# Patient Record
Sex: MALE | Race: OTHER | NOT HISPANIC OR LATINO | URBAN - METROPOLITAN AREA
[De-identification: names, ages, dates, MRNs, and addresses within clinical notes are randomized per-mention and may not be internally consistent; named-entity substitution may affect disease eponyms.]

---

## 2017-06-18 ENCOUNTER — INPATIENT (INPATIENT)
Facility: HOSPITAL | Age: 62
LOS: 17 days | Discharge: HOME HEALTH | DRG: 459 | End: 2017-07-06
Attending: ORTHOPAEDIC SURGERY | Admitting: ORTHOPAEDIC SURGERY
Payer: MEDICAID

## 2017-06-18 VITALS
SYSTOLIC BLOOD PRESSURE: 150 MMHG | TEMPERATURE: 98 F | HEART RATE: 88 BPM | OXYGEN SATURATION: 100 % | DIASTOLIC BLOOD PRESSURE: 90 MMHG | WEIGHT: 235.01 LBS | RESPIRATION RATE: 16 BRPM

## 2017-06-18 DIAGNOSIS — T84.018A BROKEN INTERNAL JOINT PROSTHESIS, OTHER SITE, INITIAL ENCOUNTER: ICD-10-CM

## 2017-06-18 LAB
ALBUMIN SERPL ELPH-MCNC: 4 G/DL — SIGNIFICANT CHANGE UP (ref 3.3–5)
ALP SERPL-CCNC: 75 U/L — SIGNIFICANT CHANGE UP (ref 40–120)
ALT FLD-CCNC: 18 U/L — SIGNIFICANT CHANGE UP (ref 10–45)
ANION GAP SERPL CALC-SCNC: 11 MMOL/L — SIGNIFICANT CHANGE UP (ref 5–17)
APTT BLD: 36.8 SEC — SIGNIFICANT CHANGE UP (ref 27.5–37.4)
AST SERPL-CCNC: 26 U/L — SIGNIFICANT CHANGE UP (ref 10–40)
BASOPHILS NFR BLD AUTO: 0.2 % — SIGNIFICANT CHANGE UP (ref 0–2)
BILIRUB SERPL-MCNC: 0.7 MG/DL — SIGNIFICANT CHANGE UP (ref 0.2–1.2)
BUN SERPL-MCNC: 13 MG/DL — SIGNIFICANT CHANGE UP (ref 7–23)
CALCIUM SERPL-MCNC: 9.2 MG/DL — SIGNIFICANT CHANGE UP (ref 8.4–10.5)
CHLORIDE SERPL-SCNC: 101 MMOL/L — SIGNIFICANT CHANGE UP (ref 96–108)
CO2 SERPL-SCNC: 25 MMOL/L — SIGNIFICANT CHANGE UP (ref 22–31)
CREAT SERPL-MCNC: 0.9 MG/DL — SIGNIFICANT CHANGE UP (ref 0.5–1.3)
EOSINOPHIL NFR BLD AUTO: 1.8 % — SIGNIFICANT CHANGE UP (ref 0–6)
GLUCOSE SERPL-MCNC: 118 MG/DL — HIGH (ref 70–99)
HCT VFR BLD CALC: 40.4 % — SIGNIFICANT CHANGE UP (ref 39–50)
HGB BLD-MCNC: 14.1 G/DL — SIGNIFICANT CHANGE UP (ref 13–17)
INR BLD: 1.03 — SIGNIFICANT CHANGE UP (ref 0.88–1.16)
LYMPHOCYTES # BLD AUTO: 23.3 % — SIGNIFICANT CHANGE UP (ref 13–44)
MCHC RBC-ENTMCNC: 28.2 PG — SIGNIFICANT CHANGE UP (ref 27–34)
MCHC RBC-ENTMCNC: 34.9 G/DL — SIGNIFICANT CHANGE UP (ref 32–36)
MCV RBC AUTO: 80.8 FL — SIGNIFICANT CHANGE UP (ref 80–100)
MONOCYTES NFR BLD AUTO: 9.6 % — SIGNIFICANT CHANGE UP (ref 2–14)
NEUTROPHILS NFR BLD AUTO: 65.1 % — SIGNIFICANT CHANGE UP (ref 43–77)
PLATELET # BLD AUTO: 249 K/UL — SIGNIFICANT CHANGE UP (ref 150–400)
POTASSIUM SERPL-MCNC: 4.9 MMOL/L — SIGNIFICANT CHANGE UP (ref 3.5–5.3)
POTASSIUM SERPL-SCNC: 4.9 MMOL/L — SIGNIFICANT CHANGE UP (ref 3.5–5.3)
PROT SERPL-MCNC: 7.4 G/DL — SIGNIFICANT CHANGE UP (ref 6–8.3)
PROTHROM AB SERPL-ACNC: 11.4 SEC — SIGNIFICANT CHANGE UP (ref 9.8–12.7)
RBC # BLD: 5 M/UL — SIGNIFICANT CHANGE UP (ref 4.2–5.8)
RBC # FLD: 13.9 % — SIGNIFICANT CHANGE UP (ref 10.3–16.9)
SODIUM SERPL-SCNC: 137 MMOL/L — SIGNIFICANT CHANGE UP (ref 135–145)
WBC # BLD: 5.5 K/UL — SIGNIFICANT CHANGE UP (ref 3.8–10.5)
WBC # FLD AUTO: 5.5 K/UL — SIGNIFICANT CHANGE UP (ref 3.8–10.5)

## 2017-06-18 PROCEDURE — 99285 EMERGENCY DEPT VISIT HI MDM: CPT | Mod: 25

## 2017-06-18 PROCEDURE — 72100 X-RAY EXAM L-S SPINE 2/3 VWS: CPT | Mod: 26

## 2017-06-18 PROCEDURE — 72070 X-RAY EXAM THORAC SPINE 2VWS: CPT | Mod: 26

## 2017-06-18 PROCEDURE — 72128 CT CHEST SPINE W/O DYE: CPT | Mod: 26

## 2017-06-18 PROCEDURE — 93010 ELECTROCARDIOGRAM REPORT: CPT

## 2017-06-18 PROCEDURE — 72131 CT LUMBAR SPINE W/O DYE: CPT | Mod: 26

## 2017-06-18 PROCEDURE — 71010: CPT | Mod: 26

## 2017-06-18 RX ORDER — OXYCODONE HYDROCHLORIDE 5 MG/1
10 TABLET ORAL EVERY 4 HOURS
Qty: 0 | Refills: 0 | Status: DISCONTINUED | OUTPATIENT
Start: 2017-06-18 | End: 2017-06-20

## 2017-06-18 RX ORDER — SODIUM CHLORIDE 9 MG/ML
1000 INJECTION, SOLUTION INTRAVENOUS
Qty: 0 | Refills: 0 | Status: DISCONTINUED | OUTPATIENT
Start: 2017-06-18 | End: 2017-06-20

## 2017-06-18 RX ORDER — ACETAMINOPHEN 500 MG
650 TABLET ORAL EVERY 6 HOURS
Qty: 0 | Refills: 0 | Status: DISCONTINUED | OUTPATIENT
Start: 2017-06-18 | End: 2017-06-20

## 2017-06-18 RX ORDER — MAGNESIUM HYDROXIDE 400 MG/1
30 TABLET, CHEWABLE ORAL DAILY
Qty: 0 | Refills: 0 | Status: DISCONTINUED | OUTPATIENT
Start: 2017-06-18 | End: 2017-06-20

## 2017-06-18 RX ORDER — MORPHINE SULFATE 50 MG/1
4 CAPSULE, EXTENDED RELEASE ORAL
Qty: 0 | Refills: 0 | Status: DISCONTINUED | OUTPATIENT
Start: 2017-06-18 | End: 2017-06-20

## 2017-06-18 RX ORDER — KETOROLAC TROMETHAMINE 30 MG/ML
30 SYRINGE (ML) INJECTION ONCE
Qty: 0 | Refills: 0 | Status: DISCONTINUED | OUTPATIENT
Start: 2017-06-18 | End: 2017-06-18

## 2017-06-18 RX ORDER — OXYCODONE HYDROCHLORIDE 5 MG/1
5 TABLET ORAL EVERY 4 HOURS
Qty: 0 | Refills: 0 | Status: DISCONTINUED | OUTPATIENT
Start: 2017-06-18 | End: 2017-06-20

## 2017-06-18 RX ORDER — DOCUSATE SODIUM 100 MG
100 CAPSULE ORAL THREE TIMES A DAY
Qty: 0 | Refills: 0 | Status: DISCONTINUED | OUTPATIENT
Start: 2017-06-18 | End: 2017-06-20

## 2017-06-18 RX ADMIN — Medication 30 MILLIGRAM(S): at 16:44

## 2017-06-18 RX ADMIN — Medication 30 MILLIGRAM(S): at 16:14

## 2017-06-18 NOTE — ED PROVIDER NOTE - OBJECTIVE STATEMENT
61yo M hx of spinal fusion by dr vee 2 yrs ago for spinal stenosis, here with low back pain today. Just came from [place], was at a wedding and was exerting himself more than usual. Felt like he strained his back during this time, and pain has worsened. Shoots down L leg, feels "spasms". Denies any numbness or parasthesias, Able to walk with cane but hurts his low back. States this pain is very different than what he had with the spinal stenosis. That pain has not recurred, and he has been doing well in the 2 years since the surgery. Taking aleve for pain with moderate relief. When asked where pain is the worst, point at diffuse low back.

## 2017-06-18 NOTE — ED PROVIDER NOTE - MEDICAL DECISION MAKING DETAILS
here with LBP. pain different from when he needed surgery, lower with sciatica like component. no spinal tenderness, and no pain over spine. able to ambulate with cane in ED which is his baseline. Will try pain meds, re-eval, and have pt follow up with his surgeron dr vee as outpatient

## 2017-06-18 NOTE — H&P ADULT - HISTORY OF PRESENT ILLNESS
Patient is a 62 year old gentleman s/p T10 to sacrum posterior decompression, T10 to sacrum posterior segmental instrumentation, T10 to sacrum posterior spinal fusion, pelvic fixation, and Neha osteotomy at L3-L4, L4-L5, and L5-S1 6/6/15.    3 weeks ago patient was lifting something heavy and felt and heard a pop in his back. Since then patient continued to have Back pain, RLE spasms, LLE pain for 3 weeks, getting worse.    Presented to the ED today.

## 2017-06-18 NOTE — H&P ADULT - NSHPPHYSICALEXAM_GEN_ALL_CORE
NAD, non labored respirations  Affected extremity:             Sensation: [x ] intact to light touch  [ ] decreased            Pain Lower back, spasm RLE, Pain LLE                  Vascular: [ x] warm well perfused; capillary refill <3 seconds          Motor exam: [ x]         [x ] Upper extremity                   Earnest (c5)   Bi(c5/6)   WE(c6)   EE(c7)    (c8)                                                R           5              5            5             5             5                                               L            5              5            5             5            5         [x ] Lower extremity                   IP(L2)     Q(L3)     TA(L4)     EHL(L5)     GS(s1)                                                 R          5           5          5            5              5                                               L           5           5         5             5              5

## 2017-06-18 NOTE — H&P ADULT - NSHPLABSRESULTS_GEN_ALL_CORE
14.1   5.5   )-----------( 249      ( 18 Jun 2017 19:28 )             40.4   06-18    137  |  101  |  13  ----------------------------<  118<H>  4.9   |  25  |  0.90    Ca    9.2      18 Jun 2017 19:28    TPro  7.4  /  Alb  4.0  /  TBili  0.7  /  DBili  x   /  AST  26  /  ALT  18  /  AlkPhos  75  06-18

## 2017-06-18 NOTE — H&P ADULT - ASSESSMENT
62 year old gentle man with broken rufus on the R side on his PSF.    1. Admit to Ortho  2. Pain control  3. SCDs  4. Diet  5. CT scan Lumbar and thoracic spine  6. Medical clearance by Dr. Zhang  7. Dr. King is aware, will see in AM.

## 2017-06-18 NOTE — ED ADULT NURSE NOTE - OBJECTIVE STATEMENT
Patient reports hearing a crack about a week ago while ambulating and has been experiencing severe back pain and difficulty ambulating since. Patient reports he has been using a cane. Will continue to monitor patient.

## 2017-06-18 NOTE — H&P ADULT - PMH
Benign prostatic hypertrophy without lower urinary tract symptoms  BPH (benign prostatic hyperplasia)  Essential hypertension  Hypertension  Type 2 diabetes mellitus  Diabetes mellitus type 2 in obese

## 2017-06-18 NOTE — ED ADULT TRIAGE NOTE - CHIEF COMPLAINT QUOTE
low back pain increased over past 2 weeks radiating to left leg and taking aleve that helps - lat at 1200;  and has spasms ; associated HA

## 2017-06-18 NOTE — ED PROVIDER NOTE - MUSCULOSKELETAL, MLM
Spine with large healed incision from thoracic all the way down to sacram, well healed, non tender to palpations. range of motion is not limited, no muscle or joint tenderness. No tenderness to c/t/l spine. No tenderness over pelvis or hip.

## 2017-06-19 DIAGNOSIS — M54.42 LUMBAGO WITH SCIATICA, LEFT SIDE: ICD-10-CM

## 2017-06-19 DIAGNOSIS — Z01.818 ENCOUNTER FOR OTHER PREPROCEDURAL EXAMINATION: ICD-10-CM

## 2017-06-19 DIAGNOSIS — N40.0 BENIGN PROSTATIC HYPERPLASIA WITHOUT LOWER URINARY TRACT SYMPTOMS: ICD-10-CM

## 2017-06-19 DIAGNOSIS — I10 ESSENTIAL (PRIMARY) HYPERTENSION: ICD-10-CM

## 2017-06-19 DIAGNOSIS — E11.9 TYPE 2 DIABETES MELLITUS WITHOUT COMPLICATIONS: ICD-10-CM

## 2017-06-19 LAB
APPEARANCE UR: CLEAR — SIGNIFICANT CHANGE UP
BILIRUB UR-MCNC: NEGATIVE — SIGNIFICANT CHANGE UP
COLOR SPEC: YELLOW — SIGNIFICANT CHANGE UP
DIFF PNL FLD: (no result)
GLUCOSE UR QL: NEGATIVE — SIGNIFICANT CHANGE UP
KETONES UR-MCNC: (no result) MG/DL
LEUKOCYTE ESTERASE UR-ACNC: NEGATIVE — SIGNIFICANT CHANGE UP
MRSA PCR RESULT.: NEGATIVE — SIGNIFICANT CHANGE UP
NITRITE UR-MCNC: NEGATIVE — SIGNIFICANT CHANGE UP
PH UR: 5.5 — SIGNIFICANT CHANGE UP (ref 5–8)
PROT UR-MCNC: NEGATIVE MG/DL — SIGNIFICANT CHANGE UP
S AUREUS DNA NOSE QL NAA+PROBE: NEGATIVE — SIGNIFICANT CHANGE UP
SP GR SPEC: 1.02 — SIGNIFICANT CHANGE UP (ref 1–1.03)
UROBILINOGEN FLD QL: 2 E.U./DL

## 2017-06-19 RX ADMIN — Medication 650 MILLIGRAM(S): at 05:50

## 2017-06-19 RX ADMIN — OXYCODONE HYDROCHLORIDE 10 MILLIGRAM(S): 5 TABLET ORAL at 22:10

## 2017-06-19 RX ADMIN — Medication 650 MILLIGRAM(S): at 06:45

## 2017-06-19 RX ADMIN — Medication 650 MILLIGRAM(S): at 21:10

## 2017-06-19 RX ADMIN — OXYCODONE HYDROCHLORIDE 10 MILLIGRAM(S): 5 TABLET ORAL at 21:10

## 2017-06-19 RX ADMIN — Medication 100 MILLIGRAM(S): at 11:20

## 2017-06-19 RX ADMIN — Medication 650 MILLIGRAM(S): at 22:10

## 2017-06-19 NOTE — PROGRESS NOTE ADULT - SUBJECTIVE AND OBJECTIVE BOX
Ortho Preop Note    Patient is a 62y old  Male who presents with a chief complaint of Back pain, RLE spasms, LLE pain for 3 weeks, getting worse    Diagnosis: Hardware failure Thoracolumbar spine  Procedure: Revision PSF [id number]  Surgeon: Dr. King                          14.1   5.5   )-----------( 249      ( 2017 19:28 )             40.4     06-18    137  |  101  |  13  ----------------------------<  118<H>  4.9   |  25  |  0.90    Ca    9.2      2017 19:28    TPro  7.4  /  Alb  4.0  /  TBili  0.7  /  DBili  x   /  AST  26  /  ALT  18  /  AlkPhos  75  06-18    PT/INR - ( 2017 19:28 )   PT: 11.4 sec;   INR: 1.03          PTT - ( 2017 19:28 )  PTT:36.8 sec  Urinalysis Basic - ( 2017 23:09 )    Color: Yellow / Appearance: Clear / S.025 / pH: x  Gluc: x / Ketone: Trace mg/dL  / Bili: NEGATIVE / Urobili: 2.0 E.U./dL   Blood: x / Protein: NEGATIVE mg/dL / Nitrite: NEGATIVE   Leuk Esterase: NEGATIVE / RBC: < 5 /HPF / WBC < 5 /HPF   Sq Epi: x / Non Sq Epi: Few /HPF / Bacteria: Present /HPF        [x] Type & Screen  [x] CBC  [x] BMP  [x] PT/PTT/INR  [x] Urinalysis  [x] Chest X-ray  [x] EKG  [x] NPO/IVF  [x] Consent  [ ] Clearance  [x] Added on to OR Schedule  [x] Anti-coagulation held  [x] MRSA/MSSA Nasal Screen     Assessment & Plan:  62yMale with Hardware failure thoracolumbar spine  -For OR tomorrow    Ortho Pager [phone]

## 2017-06-19 NOTE — CONSULT NOTE ADULT - SUBJECTIVE AND OBJECTIVE BOX
Patient is a 62y old  Male who presents with a chief complaint of Back pain, RLE spasms, LLE pain for 3 weeks, getting worse. (2017 21:37)      HPI:  Patient is a 62 year old gentleman s/p T10 to sacrum posterior decompression, T10 to sacrum posterior segmental instrumentation, T10 to sacrum posterior spinal fusion, pelvic fixation, and Neha osteotomy at L3-L4, L4-L5, and L5-S1 6/6/15.    3 weeks ago patient was lifting something heavy and felt and heard a pop in his back. Since then patient continued to have Back pain, RLE spasms, LLE pain for 3 weeks, getting worse.    Presented to the ED today. (2017 21:37)      PAST MEDICAL & SURGICAL HISTORY:  Benign prostatic hypertrophy without lower urinary tract symptoms: BPH (benign prostatic hyperplasia)  Type 2 diabetes mellitus: Diabetes mellitus type 2 in obese  Essential hypertension: Hypertension  History of total knee replacement: Total knee replacement status, right      FAMILY HISTORY:  Family history of diabetes mellitus (Sibling): Family history of diabetes mellitus      SOCIAL HISTORY:  Smoking Status: [ ] Current, [ ] Former, [ ] Never  Pack Years:    MEDICATIONS:  Pulmonary:    Antimicrobials:    Anticoagulants:    Onc:    GI/:  magnesium hydroxide Suspension 30milliLiter(s) Oral daily PRN  docusate sodium 100milliGRAM(s) Oral three times a day    Endocrine:    Cardiac:    Other Medications:  lactated ringers. 1000milliLiter(s) IV Continuous <Continuous>  acetaminophen   Tablet 650milliGRAM(s) Oral every 6 hours PRN  acetaminophen   Tablet. 650milliGRAM(s) Oral every 6 hours PRN  oxyCODONE IR 10milliGRAM(s) Oral every 4 hours PRN  oxyCODONE IR 5milliGRAM(s) Oral every 4 hours PRN  morphine  - Injectable 4milliGRAM(s) IV Push every 3 hours PRN      Allergies    No Known Allergies    Intolerances        Vital Signs Last 24 Hrs  T(C): 35.9, Max: 36.3 (- @ 05:01)  T(F): 96.6, Max: 97.3 (- @ 05:01)  HR: 58 (51 - 67)  BP: 137/74 (114/74 - 137/74)  BP(mean): --  RR: 15 (14 - 17)  SpO2: 99% (96% - 99%)    I & Os for current day (as of  @ 21:46)  =============================================  IN: 0 ml / OUT: 300 ml / NET: -300 ml        LABS:      CBC Full  -  ( 2017 19:28 )  WBC Count : 5.5 K/uL  Hemoglobin : 14.1 g/dL  Hematocrit : 40.4 %  Platelet Count - Automated : 249 K/uL  Mean Cell Volume : 80.8 fL  Mean Cell Hemoglobin : 28.2 pg  Mean Cell Hemoglobin Concentration : 34.9 g/dL  Auto Neutrophil # : x  Auto Lymphocyte # : x  Auto Monocyte # : x  Auto Eosinophil # : x  Auto Basophil # : x  Auto Neutrophil % : 65.1 %  Auto Lymphocyte % : 23.3 %  Auto Monocyte % : 9.6 %  Auto Eosinophil % : 1.8 %  Auto Basophil % : 0.2 %        137  |  101  |  13  ----------------------------<  118<H>  4.9   |  25  |  0.90    Ca    9.2      2017 19:28    TPro  7.4  /  Alb  4.0  /  TBili  0.7  /  DBili  x   /  AST  26  /  ALT  18  /  AlkPhos  75  -18    PT/INR - ( 2017 19:28 )   PT: 11.4 sec;   INR: 1.03          PTT - ( 2017 19:28 )  PTT:36.8 sec      Urinalysis Basic - ( 2017 23:09 )    Color: Yellow / Appearance: Clear / S.025 / pH: x  Gluc: x / Ketone: Trace mg/dL  / Bili: NEGATIVE / Urobili: 2.0 E.U./dL   Blood: x / Protein: NEGATIVE mg/dL / Nitrite: NEGATIVE   Leuk Esterase: NEGATIVE / RBC: < 5 /HPF / WBC < 5 /HPF   Sq Epi: x / Non Sq Epi: Few /HPF / Bacteria: Present /HPF            EXAM:  XR CHEST-FRONTAL                          PROCEDURE DATE:  2017                     INTERPRETATION:    PA CXR dated 2017 9:24 PM    CLINICAL INFORMATION: 62 years, Male, screening for disease    PRIOR STUDIES: PA CXR on 2015    FINDINGS:     Heart Size, Mediastinum & Hilar Contours: No cardiomegaly. Normal   mediastinal and hilar contours.      Lungs: The lungs are clear.  No pleural effusions.  No pneumothorax.     Bones/Soft Tissues: No acute abnormalities of the soft tissues and   osseous structures. Degenerative changes noted. Spinal fusion hardware   noted.    IMPRESSION:  No acute pathology.    Ventricular Rate 76 BPM    Atrial Rate 76 BPM    P-R Interval 194 ms    QRS Duration 90 ms    Q-T Interval 348 ms    QTC Calculation(Bezet) 391 ms    P Axis 45 degrees    R Axis 36 degrees    T Axis 23 degrees    Diagnosis Line Normal sinus rhythm with sinus arrhythmia  Low voltage QRS        RADIOLOGY & ADDITIONAL STUDIES (The following images were personally reviewed):

## 2017-06-19 NOTE — CONSULT NOTE ADULT - PROBLEM SELECTOR RECOMMENDATION 3
the blood pressure is controlled. Patient is not on antihypertensive medication as his blood pressure is better control of weight loss

## 2017-06-19 NOTE — CONSULT NOTE ADULT - PROBLEM SELECTOR RECOMMENDATION 5
The patient's medical condition is optimized for surgery.  There is no contraindication for surgery.  There is no clinical evidence neither of angina, decompensated CHF, arrhthymias, nor valvular disease.   There is no limitation of exercise capacity.  MET is 7 .  ASA class is 2 .  Dyson cardiac risk factor is low  .  DVT prophylaxis is indicated.  Pain control.  Early mobilization.  Avoid fluid overload.

## 2017-06-19 NOTE — PROGRESS NOTE ADULT - SUBJECTIVE AND OBJECTIVE BOX
Patient seen and examined at bedside.     SUBJECTIVE: No acute events overnight.  Pain tolerable.    Denies Chest Pain/SOB.    Denies Headache.    Denies Nausea/vomiting.      OBJECTIVE:   T(C): 36.3, Max: 36.8 (06-18 @ 15:48)  T(F): 97.3, Max: 98.3 (06-18 @ 15:48)  HR: 51 (51 - 88)  BP: 127/83 (127/83 - 162/77)  RR:  (16 - 18)  SpO2:  (97% - 100%)  Wt(kg): --    NAD, non labored respirations  Affected extremity:            Sensation: [x ] intact to light touch  [ ] decreased         RLE spasms, LLE pain                     Vascular: [x ] warm well perfused; capillary refill <3 seconds          Motor exam: [x ]         [ ] Upper extremity                   Earnest (c5)   Bi(c5/6)   WE(c6)   EE(c7)    (c8)                                                R           5              5            5             5             5                                               L            5              5            5             5            5         [x ] Lower extremity                   IP(L2)     Q(L3)     TA(L4)     EHL(L5)     GS(s1)                                                 R          5           5          5            5              5                                               L           5           5         5             5              5                                     14.1   5.5   )-------------------( 249      ( 06-18 @ 19:28 )                 40.4     I&O's Detail    I & Os for current day (as of 19 Jun 2017 06:08)  =============================================  IN:    Total IN: 0 ml  ---------------------------------------------  OUT:    Voided: 300 ml    Total OUT: 300 ml  ---------------------------------------------  Total NET: -300 ml      A/P :  62y Male s/p   T10 to sacrum posterior decompression, T10 to sacrum posterior segmental instrumentation, T10 to sacrum posterior spinal fusion, pelvic fixation, and Neha osteotomy at L3-L4, L4-L5, and L5-S1.6/6/2015. Presented with Jarret fracture on the R side.      -    Pain control + bowel regimen  -    DVT ppx: SCDs    -    Periop abx    -    ADAT  -    Check AM labs  -    Weight bearing status:   WBAT        -    Physical Therapy  -    Make NPO Once Dr. King decides. Consent, add on.  -    OR planning

## 2017-06-20 ENCOUNTER — TRANSCRIPTION ENCOUNTER (OUTPATIENT)
Age: 62
End: 2017-06-20

## 2017-06-20 DIAGNOSIS — I95.9 HYPOTENSION, UNSPECIFIED: ICD-10-CM

## 2017-06-20 DIAGNOSIS — D50.0 IRON DEFICIENCY ANEMIA SECONDARY TO BLOOD LOSS (CHRONIC): ICD-10-CM

## 2017-06-20 LAB
ANION GAP SERPL CALC-SCNC: 12 MMOL/L — SIGNIFICANT CHANGE UP (ref 5–17)
ANION GAP SERPL CALC-SCNC: 12 MMOL/L — SIGNIFICANT CHANGE UP (ref 5–17)
BASOPHILS NFR BLD AUTO: 0.1 % — SIGNIFICANT CHANGE UP (ref 0–2)
BUN SERPL-MCNC: 13 MG/DL — SIGNIFICANT CHANGE UP (ref 7–23)
BUN SERPL-MCNC: 13 MG/DL — SIGNIFICANT CHANGE UP (ref 7–23)
CALCIUM SERPL-MCNC: 7.9 MG/DL — LOW (ref 8.4–10.5)
CALCIUM SERPL-MCNC: 8.9 MG/DL — SIGNIFICANT CHANGE UP (ref 8.4–10.5)
CHLORIDE SERPL-SCNC: 102 MMOL/L — SIGNIFICANT CHANGE UP (ref 96–108)
CHLORIDE SERPL-SCNC: 103 MMOL/L — SIGNIFICANT CHANGE UP (ref 96–108)
CO2 SERPL-SCNC: 23 MMOL/L — SIGNIFICANT CHANGE UP (ref 22–31)
CO2 SERPL-SCNC: 26 MMOL/L — SIGNIFICANT CHANGE UP (ref 22–31)
CREAT SERPL-MCNC: 0.8 MG/DL — SIGNIFICANT CHANGE UP (ref 0.5–1.3)
CREAT SERPL-MCNC: 0.9 MG/DL — SIGNIFICANT CHANGE UP (ref 0.5–1.3)
EOSINOPHIL NFR BLD AUTO: 0.1 % — SIGNIFICANT CHANGE UP (ref 0–6)
GLUCOSE SERPL-MCNC: 117 MG/DL — HIGH (ref 70–99)
GLUCOSE SERPL-MCNC: 185 MG/DL — HIGH (ref 70–99)
HCT VFR BLD CALC: 31 % — LOW (ref 39–50)
HCT VFR BLD CALC: 34.3 % — LOW (ref 39–50)
HCT VFR BLD CALC: 39.6 % — SIGNIFICANT CHANGE UP (ref 39–50)
HGB BLD-MCNC: 10.6 G/DL — LOW (ref 13–17)
HGB BLD-MCNC: 12.3 G/DL — LOW (ref 13–17)
HGB BLD-MCNC: 13.9 G/DL — SIGNIFICANT CHANGE UP (ref 13–17)
LYMPHOCYTES # BLD AUTO: 4.6 % — LOW (ref 13–44)
LYMPHOCYTES # BLD AUTO: 5.8 % — LOW (ref 13–44)
MCHC RBC-ENTMCNC: 27.7 PG — SIGNIFICANT CHANGE UP (ref 27–34)
MCHC RBC-ENTMCNC: 28.7 PG — SIGNIFICANT CHANGE UP (ref 27–34)
MCHC RBC-ENTMCNC: 29 PG — SIGNIFICANT CHANGE UP (ref 27–34)
MCHC RBC-ENTMCNC: 34.2 G/DL — SIGNIFICANT CHANGE UP (ref 32–36)
MCHC RBC-ENTMCNC: 35.1 G/DL — SIGNIFICANT CHANGE UP (ref 32–36)
MCHC RBC-ENTMCNC: 35.9 G/DL — SIGNIFICANT CHANGE UP (ref 32–36)
MCV RBC AUTO: 80.9 FL — SIGNIFICANT CHANGE UP (ref 80–100)
MCV RBC AUTO: 81.2 FL — SIGNIFICANT CHANGE UP (ref 80–100)
MCV RBC AUTO: 81.6 FL — SIGNIFICANT CHANGE UP (ref 80–100)
MONOCYTES NFR BLD AUTO: 1.7 % — LOW (ref 2–14)
MONOCYTES NFR BLD AUTO: 4.1 % — SIGNIFICANT CHANGE UP (ref 2–14)
NEUTROPHILS NFR BLD AUTO: 91.3 % — HIGH (ref 43–77)
NEUTROPHILS NFR BLD AUTO: 92.3 % — HIGH (ref 43–77)
PLATELET # BLD AUTO: 194 K/UL — SIGNIFICANT CHANGE UP (ref 150–400)
PLATELET # BLD AUTO: 220 K/UL — SIGNIFICANT CHANGE UP (ref 150–400)
PLATELET # BLD AUTO: 228 K/UL — SIGNIFICANT CHANGE UP (ref 150–400)
POTASSIUM SERPL-MCNC: 4.2 MMOL/L — SIGNIFICANT CHANGE UP (ref 3.5–5.3)
POTASSIUM SERPL-MCNC: 4.4 MMOL/L — SIGNIFICANT CHANGE UP (ref 3.5–5.3)
POTASSIUM SERPL-SCNC: 4.2 MMOL/L — SIGNIFICANT CHANGE UP (ref 3.5–5.3)
POTASSIUM SERPL-SCNC: 4.4 MMOL/L — SIGNIFICANT CHANGE UP (ref 3.5–5.3)
RBC # BLD: 3.82 M/UL — LOW (ref 4.2–5.8)
RBC # BLD: 4.24 M/UL — SIGNIFICANT CHANGE UP (ref 4.2–5.8)
RBC # BLD: 4.85 M/UL — SIGNIFICANT CHANGE UP (ref 4.2–5.8)
RBC # FLD: 13.2 % — SIGNIFICANT CHANGE UP (ref 10.3–16.9)
RBC # FLD: 13.9 % — SIGNIFICANT CHANGE UP (ref 10.3–16.9)
RBC # FLD: 14 % — SIGNIFICANT CHANGE UP (ref 10.3–16.9)
SODIUM SERPL-SCNC: 138 MMOL/L — SIGNIFICANT CHANGE UP (ref 135–145)
SODIUM SERPL-SCNC: 140 MMOL/L — SIGNIFICANT CHANGE UP (ref 135–145)
WBC # BLD: 10.8 K/UL — HIGH (ref 3.8–10.5)
WBC # BLD: 5.9 K/UL — SIGNIFICANT CHANGE UP (ref 3.8–10.5)
WBC # BLD: 8.7 K/UL — SIGNIFICANT CHANGE UP (ref 3.8–10.5)
WBC # FLD AUTO: 10.8 K/UL — HIGH (ref 3.8–10.5)
WBC # FLD AUTO: 5.9 K/UL — SIGNIFICANT CHANGE UP (ref 3.8–10.5)
WBC # FLD AUTO: 8.7 K/UL — SIGNIFICANT CHANGE UP (ref 3.8–10.5)

## 2017-06-20 PROCEDURE — 99223 1ST HOSP IP/OBS HIGH 75: CPT | Mod: GC

## 2017-06-20 RX ORDER — NALOXONE HYDROCHLORIDE 4 MG/.1ML
0.1 SPRAY NASAL
Qty: 0 | Refills: 0 | Status: DISCONTINUED | OUTPATIENT
Start: 2017-06-20 | End: 2017-07-06

## 2017-06-20 RX ORDER — HYDROMORPHONE HYDROCHLORIDE 2 MG/ML
0.5 INJECTION INTRAMUSCULAR; INTRAVENOUS; SUBCUTANEOUS
Qty: 0 | Refills: 0 | Status: DISCONTINUED | OUTPATIENT
Start: 2017-06-20 | End: 2017-06-22

## 2017-06-20 RX ORDER — SODIUM CHLORIDE 9 MG/ML
1000 INJECTION, SOLUTION INTRAVENOUS
Qty: 0 | Refills: 0 | Status: DISCONTINUED | OUTPATIENT
Start: 2017-06-20 | End: 2017-06-28

## 2017-06-20 RX ORDER — SENNA PLUS 8.6 MG/1
2 TABLET ORAL AT BEDTIME
Qty: 0 | Refills: 0 | Status: DISCONTINUED | OUTPATIENT
Start: 2017-06-20 | End: 2017-07-06

## 2017-06-20 RX ORDER — CEFAZOLIN SODIUM 1 G
2000 VIAL (EA) INJECTION EVERY 8 HOURS
Qty: 0 | Refills: 0 | Status: COMPLETED | OUTPATIENT
Start: 2017-06-20 | End: 2017-06-20

## 2017-06-20 RX ORDER — FAMOTIDINE 10 MG/ML
20 INJECTION INTRAVENOUS EVERY 12 HOURS
Qty: 0 | Refills: 0 | Status: DISCONTINUED | OUTPATIENT
Start: 2017-06-20 | End: 2017-07-06

## 2017-06-20 RX ORDER — SODIUM CHLORIDE 9 MG/ML
500 INJECTION, SOLUTION INTRAVENOUS ONCE
Qty: 0 | Refills: 0 | Status: COMPLETED | OUTPATIENT
Start: 2017-06-20 | End: 2017-06-20

## 2017-06-20 RX ORDER — ACETAMINOPHEN 500 MG
1000 TABLET ORAL ONCE
Qty: 0 | Refills: 0 | Status: COMPLETED | OUTPATIENT
Start: 2017-06-20 | End: 2017-06-21

## 2017-06-20 RX ORDER — DOCUSATE SODIUM 100 MG
100 CAPSULE ORAL THREE TIMES A DAY
Qty: 0 | Refills: 0 | Status: DISCONTINUED | OUTPATIENT
Start: 2017-06-20 | End: 2017-07-06

## 2017-06-20 RX ORDER — ONDANSETRON 8 MG/1
4 TABLET, FILM COATED ORAL EVERY 6 HOURS
Qty: 0 | Refills: 0 | Status: DISCONTINUED | OUTPATIENT
Start: 2017-06-20 | End: 2017-07-06

## 2017-06-20 RX ORDER — HYDROMORPHONE HYDROCHLORIDE 2 MG/ML
30 INJECTION INTRAMUSCULAR; INTRAVENOUS; SUBCUTANEOUS
Qty: 0 | Refills: 0 | Status: DISCONTINUED | OUTPATIENT
Start: 2017-06-20 | End: 2017-06-22

## 2017-06-20 RX ORDER — SODIUM CHLORIDE 9 MG/ML
500 INJECTION INTRAMUSCULAR; INTRAVENOUS; SUBCUTANEOUS ONCE
Qty: 0 | Refills: 0 | Status: COMPLETED | OUTPATIENT
Start: 2017-06-20 | End: 2017-06-20

## 2017-06-20 RX ADMIN — Medication 100 MILLIGRAM(S): at 21:58

## 2017-06-20 RX ADMIN — SODIUM CHLORIDE 125 MILLILITER(S): 9 INJECTION, SOLUTION INTRAVENOUS at 15:07

## 2017-06-20 RX ADMIN — HYDROMORPHONE HYDROCHLORIDE 30 MILLILITER(S): 2 INJECTION INTRAMUSCULAR; INTRAVENOUS; SUBCUTANEOUS at 14:01

## 2017-06-20 RX ADMIN — SODIUM CHLORIDE 500 MILLILITER(S): 9 INJECTION, SOLUTION INTRAVENOUS at 14:25

## 2017-06-20 RX ADMIN — SODIUM CHLORIDE 500 MILLILITER(S): 9 INJECTION INTRAMUSCULAR; INTRAVENOUS; SUBCUTANEOUS at 20:50

## 2017-06-20 RX ADMIN — HYDROMORPHONE HYDROCHLORIDE 0.5 MILLIGRAM(S): 2 INJECTION INTRAMUSCULAR; INTRAVENOUS; SUBCUTANEOUS at 17:53

## 2017-06-20 RX ADMIN — SENNA PLUS 2 TABLET(S): 8.6 TABLET ORAL at 22:00

## 2017-06-20 RX ADMIN — Medication 100 MILLIGRAM(S): at 16:32

## 2017-06-20 RX ADMIN — Medication 100 MILLIGRAM(S): at 23:48

## 2017-06-20 RX ADMIN — HYDROMORPHONE HYDROCHLORIDE 0.5 MILLIGRAM(S): 2 INJECTION INTRAMUSCULAR; INTRAVENOUS; SUBCUTANEOUS at 17:31

## 2017-06-20 NOTE — DISCHARGE NOTE ADULT - MEDICATION SUMMARY - MEDICATIONS TO TAKE
I will START or STAY ON the medications listed below when I get home from the hospital:    acetaminophen-oxycodone 325 mg-10 mg oral tablet  -- 0.5-1 tab(s) by mouth every 4 hours, As Needed -for severe pain MDD:6  -- Caution federal law prohibits the transfer of this drug to any person other  than the person for whom it was prescribed.  May cause drowsiness.  Alcohol may intensify this effect.  Use care when operating dangerous machinery.  This prescription cannot be refilled.  This product contains acetaminophen.  Do not use  with any other product containing acetaminophen to prevent possible liver damage.  Using more of this medication than prescribed may cause serious breathing problems.    -- Indication: For Pain    tamsulosin 0.4 mg oral capsule  -- 1 cap(s) by mouth once a day (at bedtime)  -- It is very important that you take or use this exactly as directed.  Do not skip doses or discontinue unless directed by your doctor.  May cause drowsiness.  Alcohol may intensify this effect.  Use care when operating dangerous machinery.  Some non-prescription drugs may aggravate your condition.  Read all labels carefully.  If a warning appears, check with your doctor before taking.  Swallow whole.  Do not crush.  Take with food or milk.    -- Indication: For Urinary retention    docusate sodium 100 mg oral capsule  -- 1 cap(s) by mouth 3 times a day  -- Indication: For constipation

## 2017-06-20 NOTE — PROGRESS NOTE ADULT - SUBJECTIVE AND OBJECTIVE BOX
Ortho Post Op Check    Procedure: Revision PSF [id number]  Surgeon: Dr. King    Pt comfortable c/o tingling down Left leg, pain controlled  Denies CP, SOB, N/V, numbness    Vital Signs Last 24 Hrs  T(C): 36.1, Max: 36.5 (06-20 @ 13:20)  T(F): 97, Max: 97.7 (06-20 @ 13:20)  HR: 87 (62 - 87)  BP: 82/67 (80/55 - 110/80)  BP(mean): --  RR: 14 (10 - 16)  SpO2: 98% (96% - 100%)  Wt(kg): --    General: Pt Alert and oriented, NAD  DSG C/D/I back, drain x 2, arellano in place  Pulses intact b/l LE  Sensation intact b/l LE  Motor: EHL/FHL/TA/GS 5/5 b/l                          10.6   10.8  )-----------( 220      ( 20 Jun 2017 14:01 )             31.0   20 Jun 2017 14:01    138    |  103    |  13     ----------------------------<  185    4.2     |  23     |  0.80     Ca    7.9        20 Jun 2017 14:01    TPro  7.4    /  Alb  4.0    /  TBili  0.7    /  DBili  x      /  AST  26     /  ALT  18     /  AlkPhos  75     18 Jun 2017 19:28      A/P: 62yMale POD#0 s/p Revision PSF T10-pelvis  - Transfuse 2 unit pRBC for acute anemia due to surgical blood loss  - Pain management consulted  - DVT ppx: scds  - Post op abx ancef  - Bedrest/HOB flat  - d/w Dr. King, Dr. Zhang    Ortho Pager [phone]

## 2017-06-20 NOTE — DISCHARGE NOTE ADULT - CARE PROVIDERS DIRECT ADDRESSES
,DirectAddress_Unknown ,DirectAddress_Unknown,[email or web address]

## 2017-06-20 NOTE — DISCHARGE NOTE ADULT - HOSPITAL COURSE
Admitted  Surgery -  Perioperative abx  Pain Consult  PT consult  Med consult Admitted  Surgery - PSF T10 to pelvis with dural tear repair 6/20/17  Perioperative abx  Pain Consult  PT consult  Med, ID, and Uro consult

## 2017-06-20 NOTE — DISCHARGE NOTE ADULT - CARE PLAN
Principal Discharge DX:	Acute left-sided low back pain with left-sided sciatica  Goal:	improvement after surgery  Instructions for follow-up, activity and diet:	see below

## 2017-06-20 NOTE — DISCHARGE NOTE ADULT - PATIENT PORTAL LINK FT
“You can access the [employer] Patient Portal, offered by [facility], by registering with the following website: [email or web address]”

## 2017-06-20 NOTE — CONSULT NOTE ADULT - SUBJECTIVE AND OBJECTIVE BOX
Pain Management Consult Note - [facility] [phone]    Chief Complaint:  Back pain    HPI:  63 y/o male seen in PACU S/P T10-pelvis PSF today.  Patient had previous T10 to sacrum posterior decompression, T10 to sacrum posterior segmental instrumentation, T10 to sacrum posterior spinal fusion, pelvic fixation, and Neha osteotomy at L3-L4, L4-L5, and L5-S1 6/6/15. 3 weeks ago patient was lifting something heavy and felt and heard a pop in his back. Since then patient continued to have Back pain, RLE spasms, LLE pain for 3 weeks, getting worse. Pt. states he was taking aleve prn at home.      Pain is __x_ sharp ____dull ___burning ___achy ___ Intensity: ____ mild ___mod ___severe     Location _x__surgical site ____cervical ___x__lumbar ____abd ____upper ext__x__lower ext    Worse with __x__activity __x__movement _____physical therapy___ Rest    Improved with __x__medication __x__rest ____physical therapy    ROS: Const:  ___febrile   Eyes:___ENT:___CV: _-__chest pain  Resp: __-__sob  GI:_-__nausea __-_vomiting _-__abd pain _+npo ___clears __full diet __bm  :___ Musk: _+__pain ___spasm  Skin:___ Neuro:  _+ (seen in pacu)_sedation___confusion___ numbness ___weakness ___paresth  Psych:__anxiety  Endo:___ Heme:___Allergy:__NKDA_______, ___all others reviewed and negative    PAST MEDICAL & SURGICAL HISTORY:  Benign prostatic hypertrophy without lower urinary tract symptoms: BPH (benign prostatic hyperplasia)  Type 2 diabetes mellitus: Diabetes mellitus type 2 in obese  Essential hypertension: Hypertension  History of total knee replacement: Total knee replacement status, right      SH: denies___Tobacco   _denies__Alcohol                          FH:FAMILY HISTORY:  Family history of diabetes mellitus (Sibling): Family history of diabetes mellitus      lactated ringers. 1000milliLiter(s) IV Continuous <Continuous>  HYDROmorphone  Injectable 0.5milliGRAM(s) IV Push every 10 minutes PRN  ceFAZolin   IVPB 2000milliGRAM(s) IV Intermittent every 8 hours  ondansetron Injectable 4milliGRAM(s) IV Push every 6 hours PRN      T(C): 35.7, Max: 36.3 (06-19 @ 15:38)  HR: 55 (55 - 67)  BP: 148/92 (114/74 - 148/92)  RR: 16 (14 - 16)  SpO2: 99% (96% - 99%)  Wt(kg): --    T(C): 35.7, Max: 36.3 (06-19 @ 15:38)  HR: 55 (55 - 67)  BP: 148/92 (114/74 - 148/92)  RR: 16 (14 - 16)  SpO2: 99% (96% - 99%)  Wt(kg): --    T(C): 35.7, Max: 36.3 (06-19 @ 15:38)  HR: 55 (55 - 67)  BP: 148/92 (114/74 - 148/92)  RR: 16 (14 - 16)  SpO2: 99% (96% - 99%)  Wt(kg): --    PHYSICAL EXAM:  Gen Appearance: _x__no acute distress __x_appropriate        Neuro: _x__SILT feet__x__ EOM Intact Psych: AAOX_3_, __x_mood/affect appropriate        Eyes: _x__conjunctiva WNL  __x___ Pupils equal and round        ENT: _x__ears and nose atraumatic_x__ Hearing grossly intact        Neck: ___trachea midline, no visible masses ___thyroid without palpable mass    Resp: _x__Nml WOB____No tactile fremitus ___clear to auscultation    Cardio: ___extremities free from edema ____pedal pulses palpable    GI/Abdomen: __x_soft __x___ Nontender______Nondistended_____HSM    Lymphatic: ___no palpable nodes in neck  ___no palpable nodes calves and feet    Skin/Wound: ___Incision, ___Dressing c/d/i,   ____surrounding tissues soft,  ___drain/chest tube present____    Muscular: EHL ___/5  Gastrocnemius___/5    _x__absent clubbing/cyanosis      ASSESSMENT: This is a 62y old Male with a history of   ACUTE LEFT-SIDED LOW BACK PAIN WITH LEFT-SIDED: ACUTE LEFT-SIDED LOW BACK PAIN WITH LEFT-SIDED  No h/o HF  Family history of diabetes mellitus (Sibling): Family history of diabetes mellitus  Benign prostatic hypertrophy without lower urinary tract symptoms: BPH (benign prostatic hyperplasia)  Type 2 diabetes mellitus: Diabetes mellitus type 2 in obese  Essential hypertension: Hypertension  Acute left-sided low back pain with left-sided sciatica  History of total knee replacement: Total knee replacement status, right  BACK PAIN and is S/P PSF T10-pelvis and is doing well at this time.      Recommended Treatment PLAN:  1.  Dilaudid PCA 0/0.2mg/6min with 0.5 mg IV q2h prn  2.  Tylenol 1000 mg IV x1

## 2017-06-20 NOTE — DISCHARGE NOTE ADULT - CARE PROVIDER_API CALL
Ismael King), Orthopaedic Surgery  [address] [place] [address]  Phone: [phone]  Fax: [phone] Ismael King), Orthopaedic Surgery  [address] [place] [address]  Phone: [phone]  Fax: [phone]    Tom Jarrett), Urology  [address] [address]  Phone: [phone]  Fax: [phone]

## 2017-06-20 NOTE — PROGRESS NOTE ADULT - SUBJECTIVE AND OBJECTIVE BOX
Interval Events: reviewed  Patient seen and examined at bedside.    Patient is a 62y old  Male who presents with a chief complaint of Back pain, RLE spasms, LLE pain for 3 weeks, getting worse. (2017 13:05)  the patient is doing well postoperatively in the recovery room. Denies and shortness of breath chest pain. The has some incision pain    PAST MEDICAL & SURGICAL HISTORY:  Benign prostatic hypertrophy without lower urinary tract symptoms: BPH (benign prostatic hyperplasia)  Type 2 diabetes mellitus: Diabetes mellitus type 2 in obese  Essential hypertension: Hypertension  History of total knee replacement: Total knee replacement status, right      MEDICATIONS:  Pulmonary:    Antimicrobials:  ceFAZolin   IVPB 2000milliGRAM(s) IV Intermittent every 8 hours    Anticoagulants:    Cardiac:      Allergies    No Known Allergies    Intolerances        Vital Signs Last 24 Hrs  T(C): 36.6, Max: 36.6 ( @ 20:00)  T(F): 97.9, Max: 97.9 ( @ 20:00)  HR: 75 (55 - 105)  BP: 111/75 (76/56 - 148/92)  BP(mean): --  RR: 16 (10 - 16)  SpO2: 100% (94% - 100%)  I & Os for 24h ending  @ 07:00  =============================================  IN: 0 ml / OUT: 1675 ml / NET: -1675 ml    I & Os for current day (as of  @ 21:12)  =============================================  IN: 4050 ml / OUT: 1480 ml / NET: 2570 ml        LABS:      CBC Full  -  ( 2017 14:01 )  WBC Count : 10.8 K/uL  Hemoglobin : 10.6 g/dL  Hematocrit : 31.0 %  Platelet Count - Automated : 220 K/uL  Mean Cell Volume : 81.2 fL  Mean Cell Hemoglobin : 27.7 pg  Mean Cell Hemoglobin Concentration : 34.2 g/dL  Auto Neutrophil # : x  Auto Lymphocyte # : x  Auto Monocyte # : x  Auto Eosinophil # : x  Auto Basophil # : x  Auto Neutrophil % : 92.3 %  Auto Lymphocyte % : 5.8 %  Auto Monocyte % : 1.7 %  Auto Eosinophil % : 0.1 %  Auto Basophil % : 0.1 %        138  |  103  |  13  ----------------------------<  185<H>  4.2   |  23  |  0.80    Ca    7.9<L>      2017 14:01            Urinalysis Basic - ( 2017 23:09 )    Color: Yellow / Appearance: Clear / S.025 / pH: x  Gluc: x / Ketone: Trace mg/dL  / Bili: NEGATIVE / Urobili: 2.0 E.U./dL   Blood: x / Protein: NEGATIVE mg/dL / Nitrite: NEGATIVE   Leuk Esterase: NEGATIVE / RBC: < 5 /HPF / WBC < 5 /HPF   Sq Epi: x / Non Sq Epi: Few /HPF / Bacteria: Present /HPF                  RADIOLOGY & ADDITIONAL STUDIES (The following images were personally reviewed):  Ratliff:                            Yes   Urine output:               Yes     Flattus:                          Yes        No  Bowel movement:              No

## 2017-06-20 NOTE — PROGRESS NOTE ADULT - PROBLEM SELECTOR PLAN 1
the patient is stable postoperatively. There was increase in the blood loss during surgery. The pain is controlled.

## 2017-06-20 NOTE — DISCHARGE NOTE ADULT - ADDITIONAL INSTRUCTIONS
No strenuous activity, bending, twisting, heavy lifting, driving, tub bathing, or returning to work until cleared by MD.  You may shower  Remove dressing after post op day 7, then leave incision open to air.  Follow up with Dr. King in his office in 2 weeks from surgery.  Any staples/sutures will be removed in 2 weeks.   If you don't have a bowel movement by post op day 3, then take Milk of Magnesia (over the counter).  If no bowel movement by at least post op day 5, then use a Dulcolax suppository (over the counter) and/or a Fleets enema--if still no bowel movement, call your MD.  Contact your doctor if you experience: fever greater than 101.5, chills, chest pain, difficulty breathing, bleeding, redness or heat around the incision.    Please follow up with your primary care provider. No strenuous activity, bending, twisting, heavy lifting, driving, tub bathing, or returning to work until cleared by MD.  You may shower  You may leave incision open to air.  Follow up with Dr. King in his office in 2 weeks.  If you don't have a bowel movement by post op day 3, then take Milk of Magnesia (over the counter).  If no bowel movement by at least post op day 5, then use a Dulcolax suppository (over the counter) and/or a Fleets enema--if still no bowel movement, call your MD.  Contact your doctor if you experience: fever greater than 101.5, chills, chest pain, difficulty breathing, bleeding, redness or heat around the incision.    Please follow up with your primary care provider.  Please follow up with Urology.  You will be going home with a arellano leg bag because you failed multiple void trials. No strenuous activity, bending, twisting, heavy lifting, driving, tub bathing, or returning to work until cleared by MD.  You may shower  You may leave incision open to air.  Follow up with Dr. King in his office in 2 weeks.  If you don't have a bowel movement by post op day 3, then take Milk of Magnesia (over the counter).  If no bowel movement by at least post op day 5, then use a Dulcolax suppository (over the counter) and/or a Fleets enema--if still no bowel movement, call your MD.  Contact your doctor if you experience: fever greater than 101.5, chills, chest pain, difficulty breathing, bleeding, redness or heat around the incision.    Please follow up with your primary care provider.  Please follow up with a urologist, Dr Jarrett, or the urology clinic at [facility] () in a few days.  You will be going home with a arellano leg bag because you failed multiple void trials. No strenuous activity, bending, twisting, heavy lifting, driving, tub bathing, or returning to work until cleared by MD.  You may shower  You may leave incision open to air.  Follow up with Dr. King in his office in 2 weeks.  If you don't have a bowel movement by post op day 3, then take Milk of Magnesia (over the counter).  If no bowel movement by at least post op day 5, then use a Dulcolax suppository (over the counter) and/or a Fleets enema--if still no bowel movement, call your MD.  Contact your doctor if you experience: fever greater than 101.5, chills, chest pain, difficulty breathing, bleeding, redness or heat around the incision.    Please limit you fluid intake to 1500 mL a day until you follow up with your primary care provider to make sure your sodium level has continued to normalize.  Please follow up with a urologist, Dr Jarrett, or the urology clinic at [facility] () in a few days.  You will be going home with a arellano leg bag because you failed multiple void trials.

## 2017-06-21 LAB
ANION GAP SERPL CALC-SCNC: 9 MMOL/L — SIGNIFICANT CHANGE UP (ref 5–17)
BUN SERPL-MCNC: 12 MG/DL — SIGNIFICANT CHANGE UP (ref 7–23)
CALCIUM SERPL-MCNC: 8.1 MG/DL — LOW (ref 8.4–10.5)
CHLORIDE SERPL-SCNC: 106 MMOL/L — SIGNIFICANT CHANGE UP (ref 96–108)
CO2 SERPL-SCNC: 24 MMOL/L — SIGNIFICANT CHANGE UP (ref 22–31)
CREAT SERPL-MCNC: 0.6 MG/DL — SIGNIFICANT CHANGE UP (ref 0.5–1.3)
GLUCOSE SERPL-MCNC: 148 MG/DL — HIGH (ref 70–99)
HCT VFR BLD CALC: 32 % — LOW (ref 39–50)
HGB BLD-MCNC: 11.5 G/DL — LOW (ref 13–17)
LYMPHOCYTES # BLD AUTO: 6.1 % — LOW (ref 13–44)
MCHC RBC-ENTMCNC: 29.1 PG — SIGNIFICANT CHANGE UP (ref 27–34)
MCHC RBC-ENTMCNC: 35.9 G/DL — SIGNIFICANT CHANGE UP (ref 32–36)
MCV RBC AUTO: 81 FL — SIGNIFICANT CHANGE UP (ref 80–100)
MONOCYTES NFR BLD AUTO: 6.5 % — SIGNIFICANT CHANGE UP (ref 2–14)
NEUTROPHILS NFR BLD AUTO: 87.4 % — HIGH (ref 43–77)
PLATELET # BLD AUTO: 195 K/UL — SIGNIFICANT CHANGE UP (ref 150–400)
POTASSIUM SERPL-MCNC: 4.3 MMOL/L — SIGNIFICANT CHANGE UP (ref 3.5–5.3)
POTASSIUM SERPL-SCNC: 4.3 MMOL/L — SIGNIFICANT CHANGE UP (ref 3.5–5.3)
RBC # BLD: 3.95 M/UL — LOW (ref 4.2–5.8)
RBC # FLD: 13.2 % — SIGNIFICANT CHANGE UP (ref 10.3–16.9)
SODIUM SERPL-SCNC: 139 MMOL/L — SIGNIFICANT CHANGE UP (ref 135–145)
WBC # BLD: 11.4 K/UL — HIGH (ref 3.8–10.5)
WBC # FLD AUTO: 11.4 K/UL — HIGH (ref 3.8–10.5)

## 2017-06-21 PROCEDURE — 99233 SBSQ HOSP IP/OBS HIGH 50: CPT | Mod: GC

## 2017-06-21 RX ORDER — DIAZEPAM 5 MG
5 TABLET ORAL EVERY 8 HOURS
Qty: 0 | Refills: 0 | Status: DISCONTINUED | OUTPATIENT
Start: 2017-06-21 | End: 2017-06-27

## 2017-06-21 RX ORDER — LIDOCAINE 4 G/100G
1 CREAM TOPICAL ONCE
Qty: 0 | Refills: 0 | Status: COMPLETED | OUTPATIENT
Start: 2017-06-21 | End: 2017-06-21

## 2017-06-21 RX ORDER — ACETAMINOPHEN 500 MG
650 TABLET ORAL EVERY 6 HOURS
Qty: 0 | Refills: 0 | Status: DISCONTINUED | OUTPATIENT
Start: 2017-06-21 | End: 2017-07-06

## 2017-06-21 RX ADMIN — LIDOCAINE 1 PATCH: 4 CREAM TOPICAL at 22:58

## 2017-06-21 RX ADMIN — Medication 5 MILLIGRAM(S): at 21:55

## 2017-06-21 RX ADMIN — Medication 100 MILLIGRAM(S): at 21:54

## 2017-06-21 RX ADMIN — FAMOTIDINE 20 MILLIGRAM(S): 10 INJECTION INTRAVENOUS at 18:12

## 2017-06-21 RX ADMIN — Medication 650 MILLIGRAM(S): at 23:45

## 2017-06-21 RX ADMIN — Medication 650 MILLIGRAM(S): at 22:59

## 2017-06-21 RX ADMIN — Medication 400 MILLIGRAM(S): at 00:45

## 2017-06-21 RX ADMIN — SODIUM CHLORIDE 125 MILLILITER(S): 9 INJECTION, SOLUTION INTRAVENOUS at 14:02

## 2017-06-21 RX ADMIN — Medication 100 MILLIGRAM(S): at 07:11

## 2017-06-21 RX ADMIN — SODIUM CHLORIDE 125 MILLILITER(S): 9 INJECTION, SOLUTION INTRAVENOUS at 22:59

## 2017-06-21 RX ADMIN — FAMOTIDINE 20 MILLIGRAM(S): 10 INJECTION INTRAVENOUS at 07:11

## 2017-06-21 RX ADMIN — Medication 1000 MILLIGRAM(S): at 01:42

## 2017-06-21 RX ADMIN — SENNA PLUS 2 TABLET(S): 8.6 TABLET ORAL at 21:54

## 2017-06-21 NOTE — PROGRESS NOTE ADULT - SUBJECTIVE AND OBJECTIVE BOX
Interval Events: reviewed  Patient seen and examined at bedside.    Patient is a 62y old  Male who presents with a chief complaint of Back pain, RLE spasms, LLE pain for 3 weeks, getting worse. (20 Jun 2017 13:05)    he is doing well.  He had pain in the ankle  PAST MEDICAL & SURGICAL HISTORY:  Benign prostatic hypertrophy without lower urinary tract symptoms: BPH (benign prostatic hyperplasia)  Type 2 diabetes mellitus: Diabetes mellitus type 2 in obese  Essential hypertension: Hypertension  History of total knee replacement: Total knee replacement status, right      MEDICATIONS:  Pulmonary:    Antimicrobials:    Anticoagulants:    Cardiac:      Allergies    No Known Allergies    Intolerances        Vital Signs Last 24 Hrs  T(C): 37.7, Max: 37.7 (06-21 @ 17:09)  T(F): 99.8, Max: 99.8 (06-21 @ 17:09)  HR: 74 (60 - 105)  BP: 127/67 (91/69 - 128/76)  BP(mean): --  RR: 16 (12 - 18)  SpO2: 98% (94% - 100%)  I & Os for 24h ending 06-21 @ 07:00  =============================================  IN: 5300 ml / OUT: 3660 ml / NET: 1640 ml    I & Os for current day (as of 06-21 @ 21:29)  =============================================  IN: 2330 ml / OUT: 3490 ml / NET: -1160 ml        LABS:      CBC Full  -  ( 21 Jun 2017 03:46 )  WBC Count : 11.4 K/uL  Hemoglobin : 11.5 g/dL  Hematocrit : 32.0 %  Platelet Count - Automated : 195 K/uL  Mean Cell Volume : 81.0 fL  Mean Cell Hemoglobin : 29.1 pg  Mean Cell Hemoglobin Concentration : 35.9 g/dL  Auto Neutrophil # : x  Auto Lymphocyte # : x  Auto Monocyte # : x  Auto Eosinophil # : x  Auto Basophil # : x  Auto Neutrophil % : 87.4 %  Auto Lymphocyte % : 6.1 %  Auto Monocyte % : 6.5 %  Auto Eosinophil % : x  Auto Basophil % : x    06-21    139  |  106  |  12  ----------------------------<  148<H>  4.3   |  24  |  0.60    Ca    8.1<L>      21 Jun 2017 03:46                          RADIOLOGY & ADDITIONAL STUDIES (The following images were personally reviewed):  Ratliff:                            Yes     Flattus:                          Yes          Bowel movement:              No

## 2017-06-21 NOTE — PROGRESS NOTE ADULT - SUBJECTIVE AND OBJECTIVE BOX
Pain Management Progress Note - [facility] [phone]    HPI:  Pt. complains of pain in the left aldridge (per RN Dr. King is aware).  Using PCA with relief.              Pertinent PMH: Pain at: ___Back ___Neck___Knee ___Hip ___Shoulder ___ Opioid tolerance    Pain is _x__ sharp ____dull ___burning ___achy ___ Intensity: ____ mild ____mod ____severe     Location _____surgical site _____cervical _____lumbar ____abd _____upper ext__x__lower ext    Worse with __x__activity __x__movement _____physical therapy___ Rest    Improved with _x___medication __x__rest ____physical therapy    lactated ringers.  HYDROmorphone  Injectable  aluminum hydroxide/magnesium hydroxide/simethicone Suspension  famotidine    Tablet  ondansetron Injectable  docusate sodium  senna  multivitamin  HYDROmorphone PCA (1 mG/mL)  HYDROmorphone PCA (1 mG/mL) Rescue Clinician Bolus  naloxone Injectable        ROS: Const:  ___febrile   Eyes:___ENT:___CV: ___chest pain  Resp: ____sob  GI:___nausea ___vomiting ____abd pain ___npo ___clears ___full diet __bm  :___ Musk: _+__pain ___spasm  Skin:___ Neuro:  __-_oeucqizw__-_hsllhjlve____ numbness ___weakness ___paresthesia  Psych:___anxiety  Endo:___ Heme:___Allergy:___      06-21 @ 03:43187 mL/min/1.73M2      06-20 @ 14:0194 mL/min/1.73M2          Hemoglobin: 11.5 g/dL (06-21 @ 03:46)  Hemoglobin: 12.3 g/dL (06-20 @ 21:45)  Hemoglobin: 10.6 g/dL (06-20 @ 14:01)  Hemoglobin: 13.9 g/dL (06-20 @ 07:23)        T(C): 37.1, Max: 37.1 (06-21 @ 07:30)  HR: 90 (62 - 105)  BP: 102/73 (76/56 - 127/77)  RR: 12 (10 - 18)  SpO2: 94% (94% - 100%)  Wt(kg): --     PHYSICAL EXAM:  Gen Appearance: __x_no acute distress __x_appropriate         Neuro: ___SILT feet__x__ EOM Intact Psych: AAOX_3_, _x__mood/affect appropriate        Eyes: _x__conjunctiva WNL  _x____ Pupils equal and round        ENT: _x__ears and nose atraumatic__x_ Hearing grossly intact        Neck: ___trachea midline, no visible masses ___thyroid without palpable mass    Resp: _x__Nml WOB____No tactile fremitus ___clear to auscultation    Cardio: ___extremities free from edema ____pedal pulses palpable    GI/Abdomen: ___soft _____ Nontender______Nondistended_____HSM    Lymphatic: ___no palpable nodes in neck  ___no palpable nodes calves and feet    Skin/Wound: ___Incision, ___Dressing c/d/i,   ____surrounding tissues soft,  _x__drain/chest tube present____    Muscular: EHL ___/5  Gastrocnemius___/5    _x__absent clubbing/cyanosis         ASSESSMENT:  This is a 62y old Male with a history of:  ACUTE LEFT-SIDED LOW BACK PAIN WITH LEFT-SIDED  Family history of diabetes mellitus (Sibling)  Benign prostatic hypertrophy without lower urinary tract symptoms  Type 2 diabetes mellitus  Essential hypertension  Acute left-sided low back pain with left-sided sciatica  Hypotension  Anemia due to blood loss  History of total knee replacement  BACK PAIN and is S/P T10-pelvis and is doing well.        Recommended Treatment PLAN:  1.  Continue Dilaudid PCA 0/0.2mg/6min with 0.5 mg IV q2h prn  2.  Lidoderm patch to left lower leg.

## 2017-06-21 NOTE — PROGRESS NOTE ADULT - SUBJECTIVE AND OBJECTIVE BOX
Patient seen and examined at bedside.     SUBJECTIVE: No acute events overnight.  Pain tolerable.    Denies Chest Pain/SOB.    Denies Headache.    Denies Nausea/vomiting.      OBJECTIVE:   T(C): 37.1, Max: 37.1 (06-21 @ 07:30)  T(F): 98.8, Max: 98.8 (06-21 @ 07:30)  HR: 67 (62 - 105)  BP: 108/74 (76/56 - 127/77)  RR:  (10 - 18)  SpO2:  (94% - 100%)  Wt(kg): --    NAD, non labored respirations  Affected extremity:          Dressing: clean/dry/intact          Drains: 2         Sensation: [x ] intact to light touch  [ ] decreased                              Vascular: [x ] warm well perfused; capillary refill <3 seconds          Motor exam: [x ]         [ ] Upper extremity                   Earnest (c5)   Bi(c5/6)   WE(c6)   EE(c7)    (c8)                                                R           5              5            5             5             5                                               L            5              5            5             5            5         [x ] Lower extremity                   IP(L2)     Q(L3)     TA(L4)     EHL(L5)     GS(s1)                                                 R          5           5          5            2              2                                               L           5           5         5             4              2                                     11.5<L>  11.4<H> )-------------------( 195      ( 06-21 @ 03:46 )                 32.0<L>    I&O's Detail  I & Os for 24h ending 21 Jun 2017 07:00  =============================================  IN:    lactated ringers.: 2250 ml    Lactated Ringers IV Bolus: 1000 ml    PRBCs (Packed Red Blood Cells): 1000 ml    IV PiggyBack: 850 ml    Oral Fluid: 200 ml    Total IN: 5300 ml  ---------------------------------------------  OUT:    Indwelling Catheter - Urethral: 2600 ml    Drain: 570 ml    Drain: 490 ml    Total OUT: 3660 ml  ---------------------------------------------  Total NET: 1640 ml    I & Os for current day (as of 21 Jun 2017 07:55)  =============================================  IN:    lactated ringers.: 125 ml    Total IN: 125 ml  ---------------------------------------------  OUT:    Drain: 100 ml    Total OUT: 100 ml  ---------------------------------------------  Total NET: 25 ml      A/P :  62y Male s/p T10-Pelvis PSF revision 6/20/17    -    Pain control + bowel regimen  -    DVT ppx: SCDs    -    Periop abx    -    ADAT  -    Check AM labs  -    Weight bearing status:   WBAT        -    Physical Therapy  -    Resume home meds  -    Dispo planning

## 2017-06-21 NOTE — PROGRESS NOTE ADULT - SUBJECTIVE AND OBJECTIVE BOX
SUBJECTIVE: Patient seen and examined.     Pain:  well controlled      OBJECTIVE:     Vital Signs Last 24 Hrs  T(C): 37.1, Max: 37.1 (06-21 @ 07:30)  T(F): 98.8, Max: 98.8 (06-21 @ 07:30)  HR: 70 (62 - 105)  BP: 104/73 (76/56 - 128/76)  BP(mean): --  RR: 12 (10 - 18)  SpO2: 100% (94% - 100%)    PE:         Dressing: intact, drains x 2 intact  b/l LE:         Sensation: intact to light touch to patient's base line         warm, well-perfused; capillary refill < 3 seconds              I&O's Detail  I & Os for 24h ending 21 Jun 2017 07:00  =============================================  IN:    lactated ringers.: 2250 ml    Lactated Ringers IV Bolus: 1000 ml    PRBCs (Packed Red Blood Cells): 1000 ml    IV PiggyBack: 850 ml    Oral Fluid: 200 ml    Total IN: 5300 ml  ---------------------------------------------  OUT:    Indwelling Catheter - Urethral: 2600 ml    Drain: 570 ml    Drain: 490 ml    Total OUT: 3660 ml  ---------------------------------------------  Total NET: 1640 ml    I & Os for current day (as of 21 Jun 2017 12:37)  =============================================  IN:    Oral Fluid: 480 ml    lactated ringers.: 375 ml    Total IN: 855 ml  ---------------------------------------------  OUT:    Indwelling Catheter - Urethral: 1350 ml    Drain: 110 ml    Drain: 70 ml    Total OUT: 1530 ml  ---------------------------------------------  Total NET: -675 ml      LABS:                        11.5   11.4  )-----------( 195      ( 21 Jun 2017 03:46 )             32.0     06-21    139  |  106  |  12  ----------------------------<  148<H>  4.3   |  24  |  0.60    Ca    8.1<L>      21 Jun 2017 03:46            MEDICATIONS:    HYDROmorphone  Injectable 0.5milliGRAM(s) IV Push every 10 minutes PRN  ondansetron Injectable 4milliGRAM(s) IV Push every 6 hours PRN  HYDROmorphone PCA (1 mG/mL) 30milliLiter(s) PCA Continuous PCA Continuous  HYDROmorphone PCA (1 mG/mL) Rescue Clinician Bolus 0.5milliGRAM(s) IV Push every 2 hours PRN        RADIOLOGY & ADDITIONAL STUDIES:    ASSESSMENT AND PLAN:   62y Male s/p T10-Pelvis PSF revision 6/20/17      -    Pain control + bowel regimen  -    DVT ppx: SCDs    -    Periop abx    -    Weight bearing status:   Bed rest, HOB flat for dural tear       -    Physical Therapy  -    Resume home meds  -    Dispo planning

## 2017-06-22 LAB
ANION GAP SERPL CALC-SCNC: 12 MMOL/L — SIGNIFICANT CHANGE UP (ref 5–17)
BASOPHILS NFR BLD AUTO: 0.1 % — SIGNIFICANT CHANGE UP (ref 0–2)
BUN SERPL-MCNC: 10 MG/DL — SIGNIFICANT CHANGE UP (ref 7–23)
CALCIUM SERPL-MCNC: 8.6 MG/DL — SIGNIFICANT CHANGE UP (ref 8.4–10.5)
CHLORIDE SERPL-SCNC: 99 MMOL/L — SIGNIFICANT CHANGE UP (ref 96–108)
CO2 SERPL-SCNC: 26 MMOL/L — SIGNIFICANT CHANGE UP (ref 22–31)
CREAT SERPL-MCNC: 0.8 MG/DL — SIGNIFICANT CHANGE UP (ref 0.5–1.3)
EOSINOPHIL NFR BLD AUTO: 0.6 % — SIGNIFICANT CHANGE UP (ref 0–6)
GLUCOSE SERPL-MCNC: 134 MG/DL — HIGH (ref 70–99)
HCT VFR BLD CALC: 32.6 % — LOW (ref 39–50)
HGB BLD-MCNC: 11.2 G/DL — LOW (ref 13–17)
LYMPHOCYTES # BLD AUTO: 9.6 % — LOW (ref 13–44)
MCHC RBC-ENTMCNC: 28.3 PG — SIGNIFICANT CHANGE UP (ref 27–34)
MCHC RBC-ENTMCNC: 34.4 G/DL — SIGNIFICANT CHANGE UP (ref 32–36)
MCV RBC AUTO: 82.3 FL — SIGNIFICANT CHANGE UP (ref 80–100)
MONOCYTES NFR BLD AUTO: 10.7 % — SIGNIFICANT CHANGE UP (ref 2–14)
NEUTROPHILS NFR BLD AUTO: 79 % — HIGH (ref 43–77)
PLATELET # BLD AUTO: 178 K/UL — SIGNIFICANT CHANGE UP (ref 150–400)
POTASSIUM SERPL-MCNC: 3.9 MMOL/L — SIGNIFICANT CHANGE UP (ref 3.5–5.3)
POTASSIUM SERPL-SCNC: 3.9 MMOL/L — SIGNIFICANT CHANGE UP (ref 3.5–5.3)
RBC # BLD: 3.96 M/UL — LOW (ref 4.2–5.8)
RBC # FLD: 13.7 % — SIGNIFICANT CHANGE UP (ref 10.3–16.9)
SODIUM SERPL-SCNC: 137 MMOL/L — SIGNIFICANT CHANGE UP (ref 135–145)
WBC # BLD: 10.1 K/UL — SIGNIFICANT CHANGE UP (ref 3.8–10.5)
WBC # FLD AUTO: 10.1 K/UL — SIGNIFICANT CHANGE UP (ref 3.8–10.5)

## 2017-06-22 PROCEDURE — 99233 SBSQ HOSP IP/OBS HIGH 50: CPT | Mod: GC

## 2017-06-22 RX ORDER — DEXTROSE 50 % IN WATER 50 %
12.5 SYRINGE (ML) INTRAVENOUS ONCE
Qty: 0 | Refills: 0 | Status: DISCONTINUED | OUTPATIENT
Start: 2017-06-22 | End: 2017-07-06

## 2017-06-22 RX ORDER — GLUCAGON INJECTION, SOLUTION 0.5 MG/.1ML
1 INJECTION, SOLUTION SUBCUTANEOUS ONCE
Qty: 0 | Refills: 0 | Status: DISCONTINUED | OUTPATIENT
Start: 2017-06-22 | End: 2017-07-06

## 2017-06-22 RX ORDER — OXYCODONE HYDROCHLORIDE 5 MG/1
5 TABLET ORAL EVERY 4 HOURS
Qty: 0 | Refills: 0 | Status: DISCONTINUED | OUTPATIENT
Start: 2017-06-22 | End: 2017-06-24

## 2017-06-22 RX ORDER — LIDOCAINE 4 G/100G
1 CREAM TOPICAL DAILY
Qty: 0 | Refills: 0 | Status: DISCONTINUED | OUTPATIENT
Start: 2017-06-22 | End: 2017-07-06

## 2017-06-22 RX ORDER — SODIUM CHLORIDE 9 MG/ML
1000 INJECTION, SOLUTION INTRAVENOUS
Qty: 0 | Refills: 0 | Status: DISCONTINUED | OUTPATIENT
Start: 2017-06-22 | End: 2017-07-06

## 2017-06-22 RX ORDER — OXYCODONE HYDROCHLORIDE 5 MG/1
10 TABLET ORAL EVERY 4 HOURS
Qty: 0 | Refills: 0 | Status: DISCONTINUED | OUTPATIENT
Start: 2017-06-22 | End: 2017-06-29

## 2017-06-22 RX ORDER — INSULIN LISPRO 100/ML
VIAL (ML) SUBCUTANEOUS
Qty: 0 | Refills: 0 | Status: DISCONTINUED | OUTPATIENT
Start: 2017-06-22 | End: 2017-07-06

## 2017-06-22 RX ORDER — DEXTROSE 50 % IN WATER 50 %
25 SYRINGE (ML) INTRAVENOUS ONCE
Qty: 0 | Refills: 0 | Status: DISCONTINUED | OUTPATIENT
Start: 2017-06-22 | End: 2017-07-06

## 2017-06-22 RX ORDER — HYDROMORPHONE HYDROCHLORIDE 2 MG/ML
0.5 INJECTION INTRAMUSCULAR; INTRAVENOUS; SUBCUTANEOUS
Qty: 0 | Refills: 0 | Status: DISCONTINUED | OUTPATIENT
Start: 2017-06-22 | End: 2017-06-29

## 2017-06-22 RX ORDER — DEXTROSE 50 % IN WATER 50 %
1 SYRINGE (ML) INTRAVENOUS ONCE
Qty: 0 | Refills: 0 | Status: DISCONTINUED | OUTPATIENT
Start: 2017-06-22 | End: 2017-07-06

## 2017-06-22 RX ADMIN — FAMOTIDINE 20 MILLIGRAM(S): 10 INJECTION INTRAVENOUS at 06:39

## 2017-06-22 RX ADMIN — Medication 1 TABLET(S): at 11:19

## 2017-06-22 RX ADMIN — Medication 650 MILLIGRAM(S): at 21:29

## 2017-06-22 RX ADMIN — FAMOTIDINE 20 MILLIGRAM(S): 10 INJECTION INTRAVENOUS at 18:03

## 2017-06-22 RX ADMIN — Medication 650 MILLIGRAM(S): at 07:40

## 2017-06-22 RX ADMIN — LIDOCAINE 1 PATCH: 4 CREAM TOPICAL at 23:20

## 2017-06-22 RX ADMIN — HYDROMORPHONE HYDROCHLORIDE 0.5 MILLIGRAM(S): 2 INJECTION INTRAMUSCULAR; INTRAVENOUS; SUBCUTANEOUS at 16:39

## 2017-06-22 RX ADMIN — Medication 5 MILLIGRAM(S): at 21:29

## 2017-06-22 RX ADMIN — OXYCODONE HYDROCHLORIDE 10 MILLIGRAM(S): 5 TABLET ORAL at 13:27

## 2017-06-22 RX ADMIN — Medication 650 MILLIGRAM(S): at 06:40

## 2017-06-22 RX ADMIN — Medication 1 TABLET(S): at 18:03

## 2017-06-22 RX ADMIN — HYDROMORPHONE HYDROCHLORIDE 0.5 MILLIGRAM(S): 2 INJECTION INTRAMUSCULAR; INTRAVENOUS; SUBCUTANEOUS at 17:27

## 2017-06-22 RX ADMIN — Medication 100 MILLIGRAM(S): at 13:15

## 2017-06-22 RX ADMIN — SENNA PLUS 2 TABLET(S): 8.6 TABLET ORAL at 21:28

## 2017-06-22 RX ADMIN — SODIUM CHLORIDE 125 MILLILITER(S): 9 INJECTION, SOLUTION INTRAVENOUS at 06:41

## 2017-06-22 RX ADMIN — LIDOCAINE 1 PATCH: 4 CREAM TOPICAL at 11:19

## 2017-06-22 RX ADMIN — Medication 1: at 21:44

## 2017-06-22 RX ADMIN — OXYCODONE HYDROCHLORIDE 10 MILLIGRAM(S): 5 TABLET ORAL at 14:00

## 2017-06-22 RX ADMIN — Medication 100 MILLIGRAM(S): at 06:39

## 2017-06-22 RX ADMIN — Medication 100 MILLIGRAM(S): at 21:28

## 2017-06-22 RX ADMIN — LIDOCAINE 1 PATCH: 4 CREAM TOPICAL at 11:00

## 2017-06-22 NOTE — PROGRESS NOTE ADULT - SUBJECTIVE AND OBJECTIVE BOX
Interval Events: reviewed  Patient seen and examined at bedside.    Patient is a 62y old  Male who presents with a chief complaint of Back pain, RLE spasms, LLE pain for 3 weeks, getting worse. (20 Jun 2017 13:05)  he is better or feeling weak and tired easily.    PAST MEDICAL & SURGICAL HISTORY:  Benign prostatic hypertrophy without lower urinary tract symptoms: BPH (benign prostatic hyperplasia)  Type 2 diabetes mellitus: Diabetes mellitus type 2 in obese  Essential hypertension: Hypertension  History of total knee replacement: Total knee replacement status, right      MEDICATIONS:  Pulmonary:    Antimicrobials:    Anticoagulants:    Cardiac:      Allergies    No Known Allergies    Intolerances        Vital Signs Last 24 Hrs  T(C): 37.1, Max: 38.2 (06-21 @ 21:50)  T(F): 98.7, Max: 100.8 (06-21 @ 21:50)  HR: 97 (60 - 99)  BP: 119/73 (96/64 - 129/68)  BP(mean): --  RR: 18 (12 - 20)  SpO2: 95% (94% - 100%)    I & Os for current day (as of 06-22 @ 10:36)  =============================================  IN: 3950 ml / OUT: 8115 ml / NET: -4165 ml        LABS:      CBC Full  -  ( 22 Jun 2017 07:11 )  WBC Count : 10.1 K/uL  Hemoglobin : 11.2 g/dL  Hematocrit : 32.6 %  Platelet Count - Automated : 178 K/uL  Mean Cell Volume : 82.3 fL  Mean Cell Hemoglobin : 28.3 pg  Mean Cell Hemoglobin Concentration : 34.4 g/dL  Auto Neutrophil # : x  Auto Lymphocyte # : x  Auto Monocyte # : x  Auto Eosinophil # : x  Auto Basophil # : x  Auto Neutrophil % : 79.0 %  Auto Lymphocyte % : 9.6 %  Auto Monocyte % : 10.7 %  Auto Eosinophil % : 0.6 %  Auto Basophil % : 0.1 %    06-22    137  |  99  |  10  ----------------------------<  134<H>  3.9   |  26  |  0.80    Ca    8.6      22 Jun 2017 07:11                          RADIOLOGY & ADDITIONAL STUDIES (The following images were personally reviewed):  Ratliff:                            Yes   Urine output:               Yes           DVT prophylaxis:         Yes           Flattus:                          Yes           Bowel movement:         No

## 2017-06-22 NOTE — PROGRESS NOTE ADULT - SUBJECTIVE AND OBJECTIVE BOX
Pt. seen at 0920  Pain Management Progress Note - [facility] [phone]    HPI:  Pt. complains of Back pain.  States pain in the left shin is better today than yesterday.  Tolerating po.  States when using PCA, he gets a headache.  Currently on bedrest.            Pertinent PMH: Pain at: _x__Back ___Neck___Knee ___Hip ___Shoulder ___ Opioid tolerance    Pain is ___x sharp ____dull ___burning ___achy ___ Intensity: ____ mild ____mod ____severe     Location __x___surgical site _____cervical ___x__lumbar ____abd _____upper ext____lower ext    Worse with __x__activity __x__movement _____physical therapy___ Rest    Improved with __x__medication ___x_rest ____physical therapy    lactated ringers.  aluminum hydroxide/magnesium hydroxide/simethicone Suspension  famotidine    Tablet  ondansetron Injectable  docusate sodium  senna  multivitamin  naloxone Injectable  diazepam    Tablet  acetaminophen   Tablet  acetaminophen   Tablet.  lidocaine   Patch  oxyCODONE IR  oxyCODONE IR  HYDROmorphone  Injectable  trimethoprim  160 mG/sulfamethoxazole 800 mG  insulin lispro (HumaLOG) corrective regimen sliding scale  dextrose 5%.  dextrose Gel  dextrose 50% Injectable  dextrose 50% Injectable  dextrose 50% Injectable  glucagon  Injectable      ROS: Const:  ___febrile   Eyes:___ENT:___CV: ___chest pain  Resp: ____sob  GI:__-_nausea _-__vomiting ____abd pain ___npo ___clears __+_full diet __bm  :___ Musk: __+_pain ___spasm  Skin:___ Neuro:  _-__ksdjmbrh_-__fivxfazxj____ numbness ___weakness ___paresthesia  Psych:___anxiety  Endo:___ Heme:___Allergy:___      06-22 @ 07:1194 mL/min/1.73M2          Hemoglobin: 11.2 g/dL (06-22 @ 07:11)  Hemoglobin: 11.5 g/dL (06-21 @ 03:46)  Hemoglobin: 12.3 g/dL (06-20 @ 21:45)  Hemoglobin: 10.6 g/dL (06-20 @ 14:01)        T(C): 37.1, Max: 38.2 (06-21 @ 21:50)  HR: 95 (60 - 99)  BP: 109/63 (96/64 - 129/68)  RR: 16 (14 - 20)  SpO2: 96% (94% - 100%)  Wt(kg): --     PHYSICAL EXAM:  Gen Appearance: __x_no acute distress _x__appropriate         Neuro: _x__SILT feet__x__ EOM Intact Psych: AAOX_3_, __x_mood/affect appropriate        Eyes: x___conjunctiva WNL  __x___ Pupils equal and round        ENT: _x__ears and nose atraumatic_x__ Hearing grossly intact        Neck: ___trachea midline, no visible masses ___thyroid without palpable mass    Resp: _x__Nml WOB____No tactile fremitus ___clear to auscultation    Cardio: ___extremities free from edema ____pedal pulses palpable    GI/Abdomen: _x__soft __x___ Nontender______Nondistended_____HSM    Lymphatic: ___no palpable nodes in neck  ___no palpable nodes calves and feet    Skin/Wound: ___Incision, ___Dressing c/d/i,   ____surrounding tissues soft,  ___drain/chest tube present____    Muscular: EHL __5_/5  Gastrocnemius___/5    __x_absent clubbing/cyanosis         ASSESSMENT:  This is a 62y old Male with a history of:  ACUTE LEFT-SIDED LOW BACK PAIN WITH LEFT-SIDED  No h/o HF  Family history of diabetes mellitus (Sibling)  Benign prostatic hypertrophy without lower urinary tract symptoms  Type 2 diabetes mellitus  Essential hypertension  Acute left-sided low back pain with left-sided sciatica  Hypotension  Anemia due to blood loss  History of total knee replacement  BACK PAIN and is s/p T10-pelvis PSF and is complaining of headache from PCA.        Recommended Treatment PLAN:  1.  D/C PCA  2.  Oxycodone 5-10 mg po q4h prn  3.  Dilaudid 0.5 mg IV q2h prn  4.  Lidoderm patch to the left shin

## 2017-06-22 NOTE — PROGRESS NOTE ADULT - SUBJECTIVE AND OBJECTIVE BOX
SUBJECTIVE: Patient seen and examined.     Pain:  well controlled      OBJECTIVE:     Vital Signs Last 24 Hrs  T(C): 37.1, Max: 38.2 (06-21 @ 21:50)  T(F): 98.7, Max: 100.8 (06-21 @ 21:50)  HR: 95 (60 - 99)  BP: 109/63 (96/64 - 129/68)  BP(mean): --  RR: 16 (12 - 20)  SpO2: 96% (94% - 100%)    PE:         Dressing: clean/dry/intact, drains x 2 - off suction  b/l LE         Sensation: intact to light touch          Motor exam:  2 / 5 EHL          warm, well-perfused; capillary refill < 3 seconds              I&O's Detail  I & Os for 24h ending 22 Jun 2017 07:00  =============================================  IN:    lactated ringers.: 2750 ml    Oral Fluid: 1200 ml    Total IN: 3950 ml  ---------------------------------------------  OUT:    Indwelling Catheter - Urethral: 7400 ml    Drain: 435 ml    Drain: 280 ml    Total OUT: 8115 ml  ---------------------------------------------  Total NET: -4165 ml    I & Os for current day (as of 22 Jun 2017 11:35)  =============================================  IN:    Total IN: 0 ml  ---------------------------------------------  OUT:    Indwelling Catheter - Urethral: 1850 ml    Total OUT: 1850 ml  ---------------------------------------------  Total NET: -1850 ml      LABS:                        11.2   10.1  )-----------( 178      ( 22 Jun 2017 07:11 )             32.6     06-22    137  |  99  |  10  ----------------------------<  134<H>  3.9   |  26  |  0.80    Ca    8.6      22 Jun 2017 07:11            MEDICATIONS:  trimethoprim  160 mG/sulfamethoxazole 800 mG 1Tablet(s) Oral every 12 hours    ondansetron Injectable 4milliGRAM(s) IV Push every 6 hours PRN  diazepam    Tablet 5milliGRAM(s) Oral every 8 hours PRN  acetaminophen   Tablet 650milliGRAM(s) Oral every 6 hours PRN  acetaminophen   Tablet. 650milliGRAM(s) Oral every 6 hours PRN  oxyCODONE IR 5milliGRAM(s) Oral every 4 hours PRN  oxyCODONE IR 10milliGRAM(s) Oral every 4 hours PRN  HYDROmorphone  Injectable 0.5milliGRAM(s) IV Push every 2 hours PRN        RADIOLOGY & ADDITIONAL STUDIES:    ASSESSMENT AND PLAN:     62y Male s/p T10-Pelvis PSF revision 6/20/17      -    Pain control + bowel regimen  -    DVT ppx: SCDs    -    Periop abx    -    Weight bearing status:   Bed rest, HOB flat for dural tear  -    Keep drains in  -    Dr. Vicente regan  -    Physical Therapy  -    Dispo planning

## 2017-06-22 NOTE — DIETITIAN INITIAL EVALUATION ADULT. - OTHER INFO
PT is s/p revision of PSF.  Of note, pt with order for HOB flat.  Per pt, is not affecting PO intake.  Pt endorses good appetite; consuming ~75% of meals.  Preferences obtained and communicated to host.  Pt denies GI distress; pain is being managed. NKFA.  Skin: surgical incision.

## 2017-06-22 NOTE — PROGRESS NOTE ADULT - SUBJECTIVE AND OBJECTIVE BOX
Patient seen and examined at bedside.     SUBJECTIVE: No acute events overnight.  Pain tolerable.    Denies Chest Pain/SOB.    Denies Headache.    Denies Nausea/vomiting.      OBJECTIVE:   T(C): 37.1, Max: 37.1 (06-21 @ 07:30)  T(F): 98.8, Max: 98.8 (06-21 @ 07:30)  HR: 67 (62 - 105)  BP: 108/74 (76/56 - 127/77)  RR:  (10 - 18)  SpO2:  (94% - 100%)  Wt(kg): --    NAD, non labored respirations  Affected extremity:          Dressing: clean/dry/intact          Drains: 2         Sensation: [x ] intact to light touch  [ ] decreased                              Vascular: [x ] warm well perfused; capillary refill <3 seconds          Motor exam: [x ]         [ ] Upper extremity                   Earnest (c5)   Bi(c5/6)   WE(c6)   EE(c7)    (c8)                                                R           5              5            5             5             5                                               L            5              5            5             5            5         [x ] Lower extremity                   IP(L2)     Q(L3)     TA(L4)     EHL(L5)     GS(s1)                                                 R          5           5          5            2              2                                               L           5           5         5             4              2                                     11.5<L>  11.4<H> )-------------------( 195      ( 06-21 @ 03:46 )                 32.0<L>    I&O's Detail  I & Os for 24h ending 21 Jun 2017 07:00  =============================================  IN:    lactated ringers.: 2250 ml    Lactated Ringers IV Bolus: 1000 ml    PRBCs (Packed Red Blood Cells): 1000 ml    IV PiggyBack: 850 ml    Oral Fluid: 200 ml    Total IN: 5300 ml  ---------------------------------------------  OUT:    Indwelling Catheter - Urethral: 2600 ml    Drain: 570 ml    Drain: 490 ml    Total OUT: 3660 ml  ---------------------------------------------  Total NET: 1640 ml    I & Os for current day (as of 21 Jun 2017 07:55)  =============================================  IN:    lactated ringers.: 125 ml    Total IN: 125 ml  ---------------------------------------------  OUT:    Drain: 100 ml    Total OUT: 100 ml  ---------------------------------------------  Total NET: 25 ml      A/P :  62y Male s/p T10-Pelvis PSF revision 6/20/17    -    Pain control + bowel regimen  -    DVT ppx: SCDs    -    Periop abx    -    ADAT  -    Check AM labs  -    Weight bearing status:   WBAT    , Bed Rest, flat in bed for now    -    Physical Therapy  -    Resume home meds  -    Dispo planning

## 2017-06-22 NOTE — PROVIDER CONTACT NOTE (OTHER) - SITUATION
Latest temp=100.8F and px c/o of severe headache 9/10. Is currently on Dilaudid PCA, on bedrest for dural tear.

## 2017-06-23 DIAGNOSIS — R50.82 POSTPROCEDURAL FEVER: ICD-10-CM

## 2017-06-23 LAB
ANION GAP SERPL CALC-SCNC: 11 MMOL/L — SIGNIFICANT CHANGE UP (ref 5–17)
APPEARANCE UR: CLEAR — SIGNIFICANT CHANGE UP
BASOPHILS NFR BLD AUTO: 0.1 % — SIGNIFICANT CHANGE UP (ref 0–2)
BILIRUB UR-MCNC: NEGATIVE — SIGNIFICANT CHANGE UP
BUN SERPL-MCNC: 10 MG/DL — SIGNIFICANT CHANGE UP (ref 7–23)
CALCIUM SERPL-MCNC: 8.3 MG/DL — LOW (ref 8.4–10.5)
CHLORIDE SERPL-SCNC: 93 MMOL/L — LOW (ref 96–108)
CO2 SERPL-SCNC: 24 MMOL/L — SIGNIFICANT CHANGE UP (ref 22–31)
COLOR SPEC: YELLOW — SIGNIFICANT CHANGE UP
CREAT SERPL-MCNC: 0.8 MG/DL — SIGNIFICANT CHANGE UP (ref 0.5–1.3)
DIFF PNL FLD: (no result)
EOSINOPHIL NFR BLD AUTO: 0.3 % — SIGNIFICANT CHANGE UP (ref 0–6)
GLUCOSE SERPL-MCNC: 155 MG/DL — HIGH (ref 70–99)
GLUCOSE UR QL: NEGATIVE — SIGNIFICANT CHANGE UP
HBA1C BLD-MCNC: 6.1 % — HIGH (ref 4–5.6)
HCT VFR BLD CALC: 30.7 % — LOW (ref 39–50)
HGB BLD-MCNC: 10.7 G/DL — LOW (ref 13–17)
KETONES UR-MCNC: NEGATIVE — SIGNIFICANT CHANGE UP
LEUKOCYTE ESTERASE UR-ACNC: (no result)
LYMPHOCYTES # BLD AUTO: 9.9 % — LOW (ref 13–44)
MCHC RBC-ENTMCNC: 28.5 PG — SIGNIFICANT CHANGE UP (ref 27–34)
MCHC RBC-ENTMCNC: 34.9 G/DL — SIGNIFICANT CHANGE UP (ref 32–36)
MCV RBC AUTO: 81.6 FL — SIGNIFICANT CHANGE UP (ref 80–100)
MONOCYTES NFR BLD AUTO: 12.6 % — SIGNIFICANT CHANGE UP (ref 2–14)
NEUTROPHILS NFR BLD AUTO: 77 % — SIGNIFICANT CHANGE UP (ref 43–77)
NITRITE UR-MCNC: NEGATIVE — SIGNIFICANT CHANGE UP
PH UR: 8 — SIGNIFICANT CHANGE UP (ref 5–8)
PLATELET # BLD AUTO: 189 K/UL — SIGNIFICANT CHANGE UP (ref 150–400)
POTASSIUM SERPL-MCNC: 4 MMOL/L — SIGNIFICANT CHANGE UP (ref 3.5–5.3)
POTASSIUM SERPL-SCNC: 4 MMOL/L — SIGNIFICANT CHANGE UP (ref 3.5–5.3)
PROT UR-MCNC: NEGATIVE MG/DL — SIGNIFICANT CHANGE UP
RBC # BLD: 3.76 M/UL — LOW (ref 4.2–5.8)
RBC # FLD: 13.4 % — SIGNIFICANT CHANGE UP (ref 10.3–16.9)
SODIUM SERPL-SCNC: 128 MMOL/L — LOW (ref 135–145)
SP GR SPEC: 1.01 — SIGNIFICANT CHANGE UP (ref 1–1.03)
UROBILINOGEN FLD QL: 1 E.U./DL — SIGNIFICANT CHANGE UP
WBC # BLD: 11.3 K/UL — HIGH (ref 3.8–10.5)
WBC # FLD AUTO: 11.3 K/UL — HIGH (ref 3.8–10.5)

## 2017-06-23 PROCEDURE — 71010: CPT | Mod: 26

## 2017-06-23 RX ORDER — VANCOMYCIN HCL 1 G
1500 VIAL (EA) INTRAVENOUS EVERY 12 HOURS
Qty: 0 | Refills: 0 | Status: DISCONTINUED | OUTPATIENT
Start: 2017-06-23 | End: 2017-06-23

## 2017-06-23 RX ORDER — ZALEPLON 10 MG
5 CAPSULE ORAL AT BEDTIME
Qty: 0 | Refills: 0 | Status: DISCONTINUED | OUTPATIENT
Start: 2017-06-23 | End: 2017-06-30

## 2017-06-23 RX ORDER — PIPERACILLIN AND TAZOBACTAM 4; .5 G/20ML; G/20ML
3.38 INJECTION, POWDER, LYOPHILIZED, FOR SOLUTION INTRAVENOUS EVERY 6 HOURS
Qty: 0 | Refills: 0 | Status: DISCONTINUED | OUTPATIENT
Start: 2017-06-23 | End: 2017-06-29

## 2017-06-23 RX ORDER — VANCOMYCIN HCL 1 G
1000 VIAL (EA) INTRAVENOUS EVERY 12 HOURS
Qty: 0 | Refills: 0 | Status: DISCONTINUED | OUTPATIENT
Start: 2017-06-23 | End: 2017-06-24

## 2017-06-23 RX ORDER — PIPERACILLIN AND TAZOBACTAM 4; .5 G/20ML; G/20ML
3.38 INJECTION, POWDER, LYOPHILIZED, FOR SOLUTION INTRAVENOUS ONCE
Qty: 0 | Refills: 0 | Status: DISCONTINUED | OUTPATIENT
Start: 2017-06-23 | End: 2017-06-23

## 2017-06-23 RX ORDER — VANCOMYCIN HCL 1 G
1000 VIAL (EA) INTRAVENOUS EVERY 12 HOURS
Qty: 0 | Refills: 0 | Status: DISCONTINUED | OUTPATIENT
Start: 2017-06-23 | End: 2017-06-23

## 2017-06-23 RX ADMIN — Medication 1: at 22:52

## 2017-06-23 RX ADMIN — Medication 100 MILLIGRAM(S): at 05:29

## 2017-06-23 RX ADMIN — OXYCODONE HYDROCHLORIDE 10 MILLIGRAM(S): 5 TABLET ORAL at 05:30

## 2017-06-23 RX ADMIN — Medication 650 MILLIGRAM(S): at 14:49

## 2017-06-23 RX ADMIN — Medication 5 MILLIGRAM(S): at 21:17

## 2017-06-23 RX ADMIN — Medication 1: at 08:01

## 2017-06-23 RX ADMIN — Medication 1 TABLET(S): at 05:29

## 2017-06-23 RX ADMIN — LIDOCAINE 1 PATCH: 4 CREAM TOPICAL at 12:02

## 2017-06-23 RX ADMIN — Medication 650 MILLIGRAM(S): at 05:28

## 2017-06-23 RX ADMIN — OXYCODONE HYDROCHLORIDE 10 MILLIGRAM(S): 5 TABLET ORAL at 14:20

## 2017-06-23 RX ADMIN — FAMOTIDINE 20 MILLIGRAM(S): 10 INJECTION INTRAVENOUS at 05:29

## 2017-06-23 RX ADMIN — OXYCODONE HYDROCHLORIDE 10 MILLIGRAM(S): 5 TABLET ORAL at 21:00

## 2017-06-23 RX ADMIN — SODIUM CHLORIDE 125 MILLILITER(S): 9 INJECTION, SOLUTION INTRAVENOUS at 09:29

## 2017-06-23 RX ADMIN — OXYCODONE HYDROCHLORIDE 10 MILLIGRAM(S): 5 TABLET ORAL at 13:22

## 2017-06-23 RX ADMIN — Medication 100 MILLIGRAM(S): at 13:17

## 2017-06-23 RX ADMIN — SODIUM CHLORIDE 125 MILLILITER(S): 9 INJECTION, SOLUTION INTRAVENOUS at 01:08

## 2017-06-23 RX ADMIN — PIPERACILLIN AND TAZOBACTAM 200 GRAM(S): 4; .5 INJECTION, POWDER, LYOPHILIZED, FOR SOLUTION INTRAVENOUS at 16:53

## 2017-06-23 RX ADMIN — Medication 650 MILLIGRAM(S): at 21:14

## 2017-06-23 RX ADMIN — OXYCODONE HYDROCHLORIDE 10 MILLIGRAM(S): 5 TABLET ORAL at 06:45

## 2017-06-23 RX ADMIN — Medication 1 TABLET(S): at 12:02

## 2017-06-23 RX ADMIN — Medication 300 MILLIGRAM(S): at 18:15

## 2017-06-23 RX ADMIN — FAMOTIDINE 20 MILLIGRAM(S): 10 INJECTION INTRAVENOUS at 18:15

## 2017-06-23 RX ADMIN — OXYCODONE HYDROCHLORIDE 10 MILLIGRAM(S): 5 TABLET ORAL at 20:12

## 2017-06-23 RX ADMIN — SODIUM CHLORIDE 125 MILLILITER(S): 9 INJECTION, SOLUTION INTRAVENOUS at 18:15

## 2017-06-23 NOTE — CONSULT NOTE ADULT - SUBJECTIVE AND OBJECTIVE BOX
HPI:  Patient is a 62 year old gentleman s/p T10 to sacrum posterior decompression, T10 to sacrum posterior segmental instrumentation, T10 to sacrum posterior spinal fusion, pelvic fixation, and Neha osteotomy at L3-L4, L4-L5, and L5-S1 6/6/15.    3 weeks ago patient was lifting something heavy and felt and heard a pop in his back. Since then patient continued to have Back pain, RLE spasms, LLE pain for 3 weeks, getting worse.  Now s/p revision on 2017, spiking fever.        PAST MEDICAL & SURGICAL HISTORY:  Benign prostatic hypertrophy without lower urinary tract symptoms: BPH (benign prostatic hyperplasia)  Type 2 diabetes mellitus: Diabetes mellitus type 2 in obese  Essential hypertension: Hypertension  History of total knee replacement: Total knee replacement status, right        REVIEW OF SYSTEMS:    General:(+) fevers, (+) chills  Skin/Breast: no rash  Respiratory and Thorax: no SOB, no cough  Cardiovascular:	No chest pain  Gastrointestinal:	 no nausea, vomiting , diarrhea  Genitourinary:	(+) Ratliff  Musculoskeletal: no weakness, no joint swelling/pain  Neurological:no focal weakness/numbness  Endocrine:no polyuria, no polydipsia      ANTIBIOTICS:  MEDICATIONS  (STANDING):  lactated ringers. 1000milliLiter(s) IV Continuous <Continuous>  famotidine    Tablet 20milliGRAM(s) Oral every 12 hours  docusate sodium 100milliGRAM(s) Oral three times a day  senna 2Tablet(s) Oral at bedtime  multivitamin 1Tablet(s) Oral daily  lidocaine   Patch 1Patch Transdermal daily  trimethoprim  160 mG/sulfamethoxazole 800 mG 1Tablet(s) Oral every 12 hours  insulin lispro (HumaLOG) corrective regimen sliding scale  SubCutaneous Before meals and at bedtime  dextrose 5%. 1000milliLiter(s) IV Continuous <Continuous>  dextrose 50% Injectable 12.5Gram(s) IV Push once  dextrose 50% Injectable 25Gram(s) IV Push once  dextrose 50% Injectable 25Gram(s) IV Push once  piperacillin/tazobactam IVPB. 3.375Gram(s) IV Intermittent once  piperacillin/tazobactam IVPB. 3.375Gram(s) IV Intermittent every 8 hours  vancomycin  IVPB 1000milliGRAM(s) IV Intermittent every 12 hours    MEDICATIONS  (PRN):  aluminum hydroxide/magnesium hydroxide/simethicone Suspension 30milliLiter(s) Oral every 12 hours PRN Indigestion  ondansetron Injectable 4milliGRAM(s) IV Push every 6 hours PRN Nausea  naloxone Injectable 0.1milliGRAM(s) IV Push every 3 minutes PRN For ANY of the following changes in patient status:  A. RR LESS THAN 10 breaths per minute, B. Oxygen saturation LESS THAN 90%, C. Sedation score of 6  diazepam    Tablet 5milliGRAM(s) Oral every 8 hours PRN muscle spasms  acetaminophen   Tablet 650milliGRAM(s) Oral every 6 hours PRN For Temp greater than 38 C (100.4 F)  acetaminophen   Tablet. 650milliGRAM(s) Oral every 6 hours PRN Mild Pain (1 - 3)  oxyCODONE IR 5milliGRAM(s) Oral every 4 hours PRN Moderate Pain (4 - 6)  oxyCODONE IR 10milliGRAM(s) Oral every 4 hours PRN Severe Pain (7 - 10)  HYDROmorphone  Injectable 0.5milliGRAM(s) IV Push every 2 hours PRN breakthrough pain  dextrose Gel 1Dose(s) Oral once PRN Blood Glucose LESS THAN 70 milliGRAM(s)/deciliter  glucagon  Injectable 1milliGRAM(s) IntraMuscular once PRN Glucose LESS THAN 70 milligrams/deciliter      Allergies:No Known Allergies      SOCIAL HISTORY: no smoking, no ETOH    FAMILY HISTORY:  Family history of diabetes mellitus (Sibling): Family history of diabetes mellitus      Vital Signs Last 24 Hrs  T(C): 39.1, Max: 39.1 (-23 @ 14:41)  T(F): 102.3, Max: 102.3 (-23 @ 14:41)  HR: 111 (97 - 124)  BP: 147/62 (90/53 - 147/62)  BP(mean): 76 (76 - 89)  RR: 18 (16 - 20)  SpO2: 95% (94% - 97%)    PHYSICAL EXAM:  Constitutional: Well-developed, well nourished  Eyes:PASCUAL, EOMI  Ear/Nose/Throat: no oral lesion, no sinus tenderness on percussion	  Neck:no JVD, no lymphadenopathy, supple  Respiratory: CTA xiang  Cardiovascular: S1S2 RRR, no murmurs  Gastrointestinal:soft, (+) BS, no HSM, (+) Ratliff  Back (+)Dressing  Extremities:no e/e/c  Vascular: DP Pulse:right normal; left normal            LABS:                        10.7   11.3  )-----------( 189      ( 2017 05:46 )             30.7     06-23    128<L>  |  93<L>  |  10  ----------------------------<  155<H>  4.0   |  24  |  0.80    Ca    8.3<L>      2017 05:46        Urinalysis Basic - ( 2017 12:43 )    Color: Yellow / Appearance: Clear / S.010 / pH: x  Gluc: x / Ketone: NEGATIVE  / Bili: NEGATIVE / Urobili: 1.0 E.U./dL   Blood: x / Protein: NEGATIVE mg/dL / Nitrite: NEGATIVE   Leuk Esterase: Trace / RBC: Many /HPF / WBC < 5 /HPF   Sq Epi: x / Non Sq Epi: Rare /HPF / Bacteria: Present /HPF        MICROBIOLOGY:pending    RADIOLOGY & ADDITIONAL STUDIES:EXAM:  XR CHEST-FRONTAL                          PROCEDURE DATE:  2017                     INTERPRETATION:    PA CXR dated 2017 9:24 PM    CLINICAL INFORMATION: 62 years, Male, screening for disease    PRIOR STUDIES: PA CXR on 2015    FINDINGS:     Heart Size, Mediastinum & Hilar Contours: No cardiomegaly. Normal   mediastinal and hilar contours.      Lungs: The lungs are clear.  No pleural effusions.  No pneumothorax.     Bones/Soft Tissues: No acute abnormalities of the soft tissues and   osseous structures. Degenerative changes noted. Spinal fusion hardware   noted.    IMPRESSION:  No acute pathology.

## 2017-06-23 NOTE — PROGRESS NOTE ADULT - SUBJECTIVE AND OBJECTIVE BOX
Pain Management Progress Note - [facility] [phone]    HPI:  Pt. states left shin pain is improved.  Complains of headache that improves with tylenol.  Used prn pain medications minimally over the last 24 hours.  No new complaints.            Pertinent PMH: Pain at: _x__Back ___Neck___Knee ___Hip ___Shoulder ___ Opioid tolerance     Pain is ___ sharp ____dull ___burning _x__achy ___ Intensity: ____ mild ____mod ____severe     Location __x___surgical site _____cervical __x___lumbar ____abd _____upper ext____lower ext headache    Worse with __x__activity __x__movement _____physical therapy___ Rest    Improved with _x___medication _x___rest ____physical therapy    lactated ringers.  aluminum hydroxide/magnesium hydroxide/simethicone Suspension  famotidine    Tablet  ondansetron Injectable  docusate sodium  senna  multivitamin  naloxone Injectable  diazepam    Tablet  acetaminophen   Tablet  acetaminophen   Tablet.  lidocaine   Patch  oxyCODONE IR  oxyCODONE IR  HYDROmorphone  Injectable  trimethoprim  160 mG/sulfamethoxazole 800 mG  insulin lispro (HumaLOG) corrective regimen sliding scale  dextrose 5%.  dextrose Gel  dextrose 50% Injectable  dextrose 50% Injectable  dextrose 50% Injectable  glucagon  Injectable      ROS: Const:  ___febrile   Eyes:___ENT:___CV: ___chest pain  Resp: ____sob  GI:___nausea ___vomiting ____abd pain ___npo ___clears ___full diet __bm  :___ Musk: _+__pain ___spasm  Skin:___ Neuro:  _-__yxangjsk__-_bdxtfrayk____ numbness ___weakness ___paresthesia  Psych:___anxiety  Endo:___ Heme:___Allergy:___      06-23 @ 05:4694 mL/min/1.73M2          Hemoglobin: 10.7 g/dL (06-23 @ 05:46)  Hemoglobin: 11.2 g/dL (06-22 @ 07:11)        T(C): 37.3, Max: 38.9 (06-23 @ 05:27)  HR: 97 (95 - 124)  BP: 90/53 (90/53 - 132/78)  RR: 17 (16 - 20)  SpO2: 94% (94% - 99%)  Wt(kg): --     PHYSICAL EXAM:  Gen Appearance: _x__no acute distress _x__appropriate         Neuro: ___SILT feet__x__ EOM Intact Psych: AAOX_3_, _x__mood/affect appropriate        Eyes: __x_conjunctiva WNL  ____x_ Pupils equal and round        ENT: _x__ears and nose atraumatic__x_ Hearing grossly intact        Neck: ___trachea midline, no visible masses ___thyroid without palpable mass    Resp: _x__Nml WOB____No tactile fremitus ___clear to auscultation    Cardio: ___extremities free from edema ____pedal pulses palpable    GI/Abdomen: ___soft _____ Nontender______Nondistended_____HSM    Lymphatic: ___no palpable nodes in neck  ___no palpable nodes calves and feet    Skin/Wound: ___Incision, ___Dressing c/d/i,   ____surrounding tissues soft,  __x_drain/chest tube present____    Muscular: EHL ___/5  Gastrocnemius___/5    ___absent clubbing/cyanosis         ASSESSMENT:  This is a 62y old Male with a history of:  ACUTE LEFT-SIDED LOW BACK PAIN WITH LEFT-SIDED  Benign prostatic hypertrophy without lower urinary tract symptoms  Type 2 diabetes mellitus  Essential hypertension  Hypotension  Anemia due to blood loss  Type 2 diabetes mellitus  History of total knee replacement  BACK PAIN and is S/P T10-pelvis PSF and pain is controlled on the current regimen.        Recommended Treatment PLAN:  1.  Dilaudid 0.5 mg IV q2h prn  2.  Oxycodone 5-10 mg po q4h prn  3.  Lidoderm patch  4.  Valium as per ortho

## 2017-06-23 NOTE — CONSULT NOTE ADULT - PROBLEM SELECTOR RECOMMENDATION 9
1) Send Blood culture, urine culture, Chest x-ray  2) remove Ratliff if possible  3) Start Zosyn 3.375gr IV q 6h and Vancomycin 1gr IV q 12h  4) Check Vancomycin trough with 3-4 dose
the patient is scheduled for surgery tomorrow

## 2017-06-23 NOTE — CONSULT NOTE ADULT - ASSESSMENT
62y old Male with a history of Anemia, Type 2 diabetes mellitus, TKR, BPH, HTN, s/p T10 to sacrum posterior decompression, T10 to sacrum posterior segmental instrumentation, T10 to sacrum posterior spinal fusion, pelvic fixation, and Neha osteotomy at L3-L4, L4-L5, and [id number] with worsening back pain, now s/p revision s/p T10-pelvis PSF

## 2017-06-23 NOTE — PROGRESS NOTE ADULT - SUBJECTIVE AND OBJECTIVE BOX
POST OPERATIVE DAY #: 3  STATUS POST: L3-S1 revision osteotomy              SUBJECTIVE: Patient seen and examined.     Pain:  well controlled      OBJECTIVE:     Vital Signs Last 24 Hrs  T(C): 37.3, Max: 38.9 ( @ 05:27)  T(F): 99.2, Max: 102 ( @ 05:27)  HR: 97 (97 - 124)  BP: 90/53 (90/53 - 132/78)  BP(mean): 76 (76 - 89)  RR: 17 (16 - 20)  SpO2: 94% (94% - 97%)    PE:         Dressing: clean/dry/intact   b/l LE:         Sensation: intact to light touch          Motor exam:  2 / 5 EHL          warm, well-perfused; capillary refill < 3 seconds              I&O's Detail  I & Os for 24h ending 2017 07:00  =============================================  IN:    lactated ringers.: 3000 ml    Oral Fluid: 480 ml    Total IN: 3480 ml  ---------------------------------------------  OUT:    Indwelling Catheter - Urethral: 6700 ml    Drain: 60 ml    Drain: 40 ml    Total OUT: 6800 ml  ---------------------------------------------  Total NET: -3320 ml    I & Os for current day (as of 2017 14:02)  =============================================  IN:    lactated ringers.: 625 ml    Oral Fluid: 240 ml    Total IN: 865 ml  ---------------------------------------------  OUT:    Indwelling Catheter - Urethral: 1800 ml    Total OUT: 1800 ml  ---------------------------------------------  Total NET: -935 ml      LABS:                        10.7   11.3  )-----------( 189      ( 2017 05:46 )             30.7     06-23    128<L>  |  93<L>  |  10  ----------------------------<  155<H>  4.0   |  24  |  0.80    Ca    8.3<L>      2017 05:46        Urinalysis Basic - ( 2017 12:43 )    Color: Yellow / Appearance: Clear / S.010 / pH: x  Gluc: x / Ketone: NEGATIVE  / Bili: NEGATIVE / Urobili: 1.0 E.U./dL   Blood: x / Protein: NEGATIVE mg/dL / Nitrite: NEGATIVE   Leuk Esterase: Trace / RBC: Many /HPF / WBC < 5 /HPF   Sq Epi: x / Non Sq Epi: Rare /HPF / Bacteria: Present /HPF        MEDICATIONS:  trimethoprim  160 mG/sulfamethoxazole 800 mG 1Tablet(s) Oral every 12 hours    ondansetron Injectable 4milliGRAM(s) IV Push every 6 hours PRN  diazepam    Tablet 5milliGRAM(s) Oral every 8 hours PRN  acetaminophen   Tablet 650milliGRAM(s) Oral every 6 hours PRN  acetaminophen   Tablet. 650milliGRAM(s) Oral every 6 hours PRN  oxyCODONE IR 5milliGRAM(s) Oral every 4 hours PRN  oxyCODONE IR 10milliGRAM(s) Oral every 4 hours PRN  HYDROmorphone  Injectable 0.5milliGRAM(s) IV Push every 2 hours PRN        RADIOLOGY & ADDITIONAL STUDIES:    ASSESSMENT AND PLAN:     62y Male s/p T10-Pelvis PSF revision 17      -    Pain control + bowel regimen  -    DVT ppx: SCDs    -    Periop abx    -    Weight bearing status:   Bed rest, HOB flat for dural tear  -    Keep drains in, off suction  -    UA, Ucx and cxr ordered for overnight tmax 102  -    Dr. Vicente xie appreciated  -    Physical Therapy  -    Dispo planning

## 2017-06-23 NOTE — PROGRESS NOTE ADULT - SUBJECTIVE AND OBJECTIVE BOX
Patient seen and examined at bedside.     SUBJECTIVE: No acute events overnight.  Pain tolerable.    Denies Chest Pain/SOB.    Denies Headache.    Denies Nausea/vomiting.      OBJECTIVE:   Vital Signs Last 24 Hrs  T(C): 38.9, Max: 38.9 (06-23 @ 05:27)  T(F): 102, Max: 102 (06-23 @ 05:27)  HR: 106 (95 - 124)  BP: 101/57 (101/57 - 132/78)  BP(mean): 76 (76 - 89)  RR: 18 (16 - 20)  SpO2: 96% (94% - 99%)    NAD, non labored respirations  Affected extremity:          Dressing: clean/dry/intact          Drains: 2         Sensation: [x ] intact to light touch  [ ] decreased                              Vascular: [x ] warm well perfused; capillary refill <3 seconds          Motor exam: [x ]         [ ] Upper extremity                   Earnest (c5)   Bi(c5/6)   WE(c6)   EE(c7)    (c8)                                                R           5              5            5             5             5                                               L            5              5            5             5            5         [x ] Lower extremity                   IP(L2)     Q(L3)     TA(L4)     EHL(L5)     GS(s1)                                                 R          5           5          5            2              2                                               L           5           5         5             4              2                                            11.2   10.1  )-----------( 178      ( 22 Jun 2017 07:11 )             32.6     I&O's Detail  I & Os for 24h ending 22 Jun 2017 07:00  =============================================  IN:    lactated ringers.: 2750 ml    Oral Fluid: 1200 ml    Total IN: 3950 ml  ---------------------------------------------  OUT:    Indwelling Catheter - Urethral: 7400 ml    Drain: 435 ml    Drain: 280 ml    Total OUT: 8115 ml  ---------------------------------------------  Total NET: -4165 ml    I & Os for current day (as of 23 Jun 2017 06:19)  =============================================  IN:    lactated ringers.: 2750 ml    Oral Fluid: 480 ml    Total IN: 3230 ml  ---------------------------------------------  OUT:    Indwelling Catheter - Urethral: 6700 ml    Drain: 60 ml    Drain: 40 ml    Total OUT: 6800 ml  ---------------------------------------------  Total NET: -3570 ml        A/P :  62y Male s/p T10-Pelvis PSF revision 6/20/17    -    Pain control + bowel regimen  -    DVT ppx: SCDs    -    Periop abx    -    ADAT  -    Check AM labs  -    Weight bearing status:   WBAT    , Bed Rest, flat in bed for now    -    Physical Therapy  -    Resume home meds  -    Dispo planning

## 2017-06-24 LAB
ANION GAP SERPL CALC-SCNC: 10 MMOL/L — SIGNIFICANT CHANGE UP (ref 5–17)
BUN SERPL-MCNC: 10 MG/DL — SIGNIFICANT CHANGE UP (ref 7–23)
CALCIUM SERPL-MCNC: 8.2 MG/DL — LOW (ref 8.4–10.5)
CHLORIDE SERPL-SCNC: 95 MMOL/L — LOW (ref 96–108)
CO2 SERPL-SCNC: 24 MMOL/L — SIGNIFICANT CHANGE UP (ref 22–31)
CREAT SERPL-MCNC: 0.9 MG/DL — SIGNIFICANT CHANGE UP (ref 0.5–1.3)
GLUCOSE SERPL-MCNC: 131 MG/DL — HIGH (ref 70–99)
HCT VFR BLD CALC: 27.7 % — LOW (ref 39–50)
HGB BLD-MCNC: 9.8 G/DL — LOW (ref 13–17)
MCHC RBC-ENTMCNC: 28.9 PG — SIGNIFICANT CHANGE UP (ref 27–34)
MCHC RBC-ENTMCNC: 35.4 G/DL — SIGNIFICANT CHANGE UP (ref 32–36)
MCV RBC AUTO: 81.7 FL — SIGNIFICANT CHANGE UP (ref 80–100)
PLATELET # BLD AUTO: 203 K/UL — SIGNIFICANT CHANGE UP (ref 150–400)
POTASSIUM SERPL-MCNC: 3.7 MMOL/L — SIGNIFICANT CHANGE UP (ref 3.5–5.3)
POTASSIUM SERPL-SCNC: 3.7 MMOL/L — SIGNIFICANT CHANGE UP (ref 3.5–5.3)
RBC # BLD: 3.39 M/UL — LOW (ref 4.2–5.8)
RBC # FLD: 13.7 % — SIGNIFICANT CHANGE UP (ref 10.3–16.9)
SODIUM SERPL-SCNC: 129 MMOL/L — LOW (ref 135–145)
WBC # BLD: 9.4 K/UL — SIGNIFICANT CHANGE UP (ref 3.8–10.5)
WBC # FLD AUTO: 9.4 K/UL — SIGNIFICANT CHANGE UP (ref 3.8–10.5)

## 2017-06-24 PROCEDURE — 72100 X-RAY EXAM L-S SPINE 2/3 VWS: CPT | Mod: 26

## 2017-06-24 RX ORDER — SODIUM CHLORIDE 0.65 %
1 AEROSOL, SPRAY (ML) NASAL EVERY 4 HOURS
Qty: 0 | Refills: 0 | Status: DISCONTINUED | OUTPATIENT
Start: 2017-06-24 | End: 2017-07-06

## 2017-06-24 RX ORDER — VANCOMYCIN HCL 1 G
1250 VIAL (EA) INTRAVENOUS EVERY 12 HOURS
Qty: 0 | Refills: 0 | Status: DISCONTINUED | OUTPATIENT
Start: 2017-06-24 | End: 2017-06-26

## 2017-06-24 RX ADMIN — LIDOCAINE 1 PATCH: 4 CREAM TOPICAL at 12:25

## 2017-06-24 RX ADMIN — OXYCODONE HYDROCHLORIDE 10 MILLIGRAM(S): 5 TABLET ORAL at 03:00

## 2017-06-24 RX ADMIN — PIPERACILLIN AND TAZOBACTAM 200 GRAM(S): 4; .5 INJECTION, POWDER, LYOPHILIZED, FOR SOLUTION INTRAVENOUS at 05:31

## 2017-06-24 RX ADMIN — OXYCODONE HYDROCHLORIDE 5 MILLIGRAM(S): 5 TABLET ORAL at 09:42

## 2017-06-24 RX ADMIN — PIPERACILLIN AND TAZOBACTAM 200 GRAM(S): 4; .5 INJECTION, POWDER, LYOPHILIZED, FOR SOLUTION INTRAVENOUS at 12:26

## 2017-06-24 RX ADMIN — HYDROMORPHONE HYDROCHLORIDE 0.5 MILLIGRAM(S): 2 INJECTION INTRAMUSCULAR; INTRAVENOUS; SUBCUTANEOUS at 21:32

## 2017-06-24 RX ADMIN — Medication 166.67 MILLIGRAM(S): at 07:18

## 2017-06-24 RX ADMIN — OXYCODONE HYDROCHLORIDE 10 MILLIGRAM(S): 5 TABLET ORAL at 10:37

## 2017-06-24 RX ADMIN — Medication 1 SPRAY(S): at 14:04

## 2017-06-24 RX ADMIN — HYDROMORPHONE HYDROCHLORIDE 0.5 MILLIGRAM(S): 2 INJECTION INTRAMUSCULAR; INTRAVENOUS; SUBCUTANEOUS at 12:58

## 2017-06-24 RX ADMIN — HYDROMORPHONE HYDROCHLORIDE 0.5 MILLIGRAM(S): 2 INJECTION INTRAMUSCULAR; INTRAVENOUS; SUBCUTANEOUS at 13:12

## 2017-06-24 RX ADMIN — FAMOTIDINE 20 MILLIGRAM(S): 10 INJECTION INTRAVENOUS at 18:06

## 2017-06-24 RX ADMIN — HYDROMORPHONE HYDROCHLORIDE 0.5 MILLIGRAM(S): 2 INJECTION INTRAMUSCULAR; INTRAVENOUS; SUBCUTANEOUS at 21:45

## 2017-06-24 RX ADMIN — Medication 5 MILLIGRAM(S): at 00:15

## 2017-06-24 RX ADMIN — OXYCODONE HYDROCHLORIDE 5 MILLIGRAM(S): 5 TABLET ORAL at 10:07

## 2017-06-24 RX ADMIN — PIPERACILLIN AND TAZOBACTAM 200 GRAM(S): 4; .5 INJECTION, POWDER, LYOPHILIZED, FOR SOLUTION INTRAVENOUS at 00:15

## 2017-06-24 RX ADMIN — OXYCODONE HYDROCHLORIDE 10 MILLIGRAM(S): 5 TABLET ORAL at 02:25

## 2017-06-24 RX ADMIN — OXYCODONE HYDROCHLORIDE 10 MILLIGRAM(S): 5 TABLET ORAL at 19:42

## 2017-06-24 RX ADMIN — FAMOTIDINE 20 MILLIGRAM(S): 10 INJECTION INTRAVENOUS at 05:00

## 2017-06-24 RX ADMIN — Medication 650 MILLIGRAM(S): at 04:56

## 2017-06-24 RX ADMIN — OXYCODONE HYDROCHLORIDE 5 MILLIGRAM(S): 5 TABLET ORAL at 10:12

## 2017-06-24 RX ADMIN — Medication 1 TABLET(S): at 12:26

## 2017-06-24 RX ADMIN — SODIUM CHLORIDE 125 MILLILITER(S): 9 INJECTION, SOLUTION INTRAVENOUS at 18:15

## 2017-06-24 RX ADMIN — LIDOCAINE 1 PATCH: 4 CREAM TOPICAL at 00:55

## 2017-06-24 RX ADMIN — Medication 100 MILLIGRAM(S): at 05:00

## 2017-06-24 RX ADMIN — PIPERACILLIN AND TAZOBACTAM 200 GRAM(S): 4; .5 INJECTION, POWDER, LYOPHILIZED, FOR SOLUTION INTRAVENOUS at 18:08

## 2017-06-24 RX ADMIN — Medication 5 MILLIGRAM(S): at 05:31

## 2017-06-24 RX ADMIN — OXYCODONE HYDROCHLORIDE 10 MILLIGRAM(S): 5 TABLET ORAL at 20:15

## 2017-06-24 RX ADMIN — SODIUM CHLORIDE 125 MILLILITER(S): 9 INJECTION, SOLUTION INTRAVENOUS at 07:39

## 2017-06-24 RX ADMIN — Medication 1: at 23:33

## 2017-06-24 RX ADMIN — Medication 100 MILLIGRAM(S): at 14:04

## 2017-06-24 RX ADMIN — Medication 166.67 MILLIGRAM(S): at 18:06

## 2017-06-24 RX ADMIN — Medication 650 MILLIGRAM(S): at 14:04

## 2017-06-24 RX ADMIN — SENNA PLUS 2 TABLET(S): 8.6 TABLET ORAL at 05:00

## 2017-06-24 NOTE — PROGRESS NOTE ADULT - SUBJECTIVE AND OBJECTIVE BOX
S: Patient seen and examined. Doing well this AM. Pain reported but controlled. No acute events overnight.    O:  Vital Signs Last 24 Hrs  T(C): 37.3, Max: 39.1 (06-23 @ 14:41)  T(F): 99.1, Max: 102.3 (06-23 @ 14:41)  HR: 99 (94 - 111)  BP: 108/56 (90/53 - 147/62)  BP(mean): --  RR: 17 (16 - 18)  SpO2: 99% (92% - 99%)    Motor: 5/5 GS/TA/EHL/FHL BL   SILT to patients baseline BL  WWP DP PT BL  Dressing C/D/I  Hemovac 4mL overnight

## 2017-06-24 NOTE — PROGRESS NOTE ADULT - ASSESSMENT
A/P: 62y s/p T10 to sacrum posterior decompression, T10 to sacrum posterior segmental instrumentation, T10 to sacrum posterior spinal fusion, pelvic fixation, and Neha osteotomy at L3-L4, L4-L5, and L5-S1.6/6/2015  -stable  -pain control  -elevate bed to 15 degrees as tolerated  -SCD  -Vanc/zoysn  -f/u blood culture, NGTD  -diet as tolerated  -f/u labs  -disposition: pending

## 2017-06-25 LAB
ANION GAP SERPL CALC-SCNC: 11 MMOL/L — SIGNIFICANT CHANGE UP (ref 5–17)
BUN SERPL-MCNC: 10 MG/DL — SIGNIFICANT CHANGE UP (ref 7–23)
CALCIUM SERPL-MCNC: 7.9 MG/DL — LOW (ref 8.4–10.5)
CHLORIDE SERPL-SCNC: 95 MMOL/L — LOW (ref 96–108)
CO2 SERPL-SCNC: 24 MMOL/L — SIGNIFICANT CHANGE UP (ref 22–31)
CREAT SERPL-MCNC: 0.9 MG/DL — SIGNIFICANT CHANGE UP (ref 0.5–1.3)
CRP SERPL-MCNC: 21.1 MG/DL — HIGH (ref 0–0.4)
CULTURE RESULTS: NO GROWTH — SIGNIFICANT CHANGE UP
ERYTHROCYTE [SEDIMENTATION RATE] IN BLOOD: 83 MM/HR — HIGH
GLUCOSE SERPL-MCNC: 132 MG/DL — HIGH (ref 70–99)
HCT VFR BLD CALC: 27.8 % — LOW (ref 39–50)
HGB BLD-MCNC: 9.9 G/DL — LOW (ref 13–17)
MCHC RBC-ENTMCNC: 28.9 PG — SIGNIFICANT CHANGE UP (ref 27–34)
MCHC RBC-ENTMCNC: 35.6 G/DL — SIGNIFICANT CHANGE UP (ref 32–36)
MCV RBC AUTO: 81.3 FL — SIGNIFICANT CHANGE UP (ref 80–100)
PLATELET # BLD AUTO: 238 K/UL — SIGNIFICANT CHANGE UP (ref 150–400)
POTASSIUM SERPL-MCNC: 3.8 MMOL/L — SIGNIFICANT CHANGE UP (ref 3.5–5.3)
POTASSIUM SERPL-SCNC: 3.8 MMOL/L — SIGNIFICANT CHANGE UP (ref 3.5–5.3)
RBC # BLD: 3.42 M/UL — LOW (ref 4.2–5.8)
RBC # FLD: 13.9 % — SIGNIFICANT CHANGE UP (ref 10.3–16.9)
SODIUM SERPL-SCNC: 130 MMOL/L — LOW (ref 135–145)
SPECIMEN SOURCE: SIGNIFICANT CHANGE UP
VANCOMYCIN TROUGH SERPL-MCNC: 8 UG/ML — LOW (ref 10–20)
WBC # BLD: 10.1 K/UL — SIGNIFICANT CHANGE UP (ref 3.8–10.5)
WBC # FLD AUTO: 10.1 K/UL — SIGNIFICANT CHANGE UP (ref 3.8–10.5)

## 2017-06-25 RX ORDER — SIMETHICONE 80 MG/1
80 TABLET, CHEWABLE ORAL EVERY 4 HOURS
Qty: 0 | Refills: 0 | Status: DISCONTINUED | OUTPATIENT
Start: 2017-06-25 | End: 2017-07-05

## 2017-06-25 RX ORDER — METOCLOPRAMIDE HCL 10 MG
10 TABLET ORAL ONCE
Qty: 0 | Refills: 0 | Status: COMPLETED | OUTPATIENT
Start: 2017-06-25 | End: 2017-06-25

## 2017-06-25 RX ADMIN — OXYCODONE HYDROCHLORIDE 10 MILLIGRAM(S): 5 TABLET ORAL at 07:02

## 2017-06-25 RX ADMIN — Medication 100 MILLIGRAM(S): at 14:55

## 2017-06-25 RX ADMIN — PIPERACILLIN AND TAZOBACTAM 200 GRAM(S): 4; .5 INJECTION, POWDER, LYOPHILIZED, FOR SOLUTION INTRAVENOUS at 00:11

## 2017-06-25 RX ADMIN — Medication 100 MILLIGRAM(S): at 22:09

## 2017-06-25 RX ADMIN — Medication 650 MILLIGRAM(S): at 05:05

## 2017-06-25 RX ADMIN — PIPERACILLIN AND TAZOBACTAM 200 GRAM(S): 4; .5 INJECTION, POWDER, LYOPHILIZED, FOR SOLUTION INTRAVENOUS at 05:05

## 2017-06-25 RX ADMIN — OXYCODONE HYDROCHLORIDE 10 MILLIGRAM(S): 5 TABLET ORAL at 11:50

## 2017-06-25 RX ADMIN — LIDOCAINE 1 PATCH: 4 CREAM TOPICAL at 14:54

## 2017-06-25 RX ADMIN — Medication 5 MILLIGRAM(S): at 12:12

## 2017-06-25 RX ADMIN — PIPERACILLIN AND TAZOBACTAM 200 GRAM(S): 4; .5 INJECTION, POWDER, LYOPHILIZED, FOR SOLUTION INTRAVENOUS at 18:27

## 2017-06-25 RX ADMIN — Medication 1 TABLET(S): at 12:02

## 2017-06-25 RX ADMIN — Medication 166.67 MILLIGRAM(S): at 22:10

## 2017-06-25 RX ADMIN — PIPERACILLIN AND TAZOBACTAM 200 GRAM(S): 4; .5 INJECTION, POWDER, LYOPHILIZED, FOR SOLUTION INTRAVENOUS at 11:50

## 2017-06-25 RX ADMIN — Medication 10 MILLIGRAM(S): at 20:54

## 2017-06-25 RX ADMIN — Medication 1 TABLET(S): at 20:03

## 2017-06-25 RX ADMIN — SIMETHICONE 80 MILLIGRAM(S): 80 TABLET, CHEWABLE ORAL at 12:22

## 2017-06-25 RX ADMIN — OXYCODONE HYDROCHLORIDE 10 MILLIGRAM(S): 5 TABLET ORAL at 02:00

## 2017-06-25 RX ADMIN — Medication 1: at 18:35

## 2017-06-25 RX ADMIN — Medication 30 MILLILITER(S): at 23:02

## 2017-06-25 RX ADMIN — SIMETHICONE 80 MILLIGRAM(S): 80 TABLET, CHEWABLE ORAL at 16:21

## 2017-06-25 RX ADMIN — Medication 1 TABLET(S): at 21:00

## 2017-06-25 RX ADMIN — LIDOCAINE 1 PATCH: 4 CREAM TOPICAL at 00:47

## 2017-06-25 RX ADMIN — OXYCODONE HYDROCHLORIDE 10 MILLIGRAM(S): 5 TABLET ORAL at 07:59

## 2017-06-25 RX ADMIN — Medication 5 MILLIGRAM(S): at 01:20

## 2017-06-25 RX ADMIN — Medication 650 MILLIGRAM(S): at 20:50

## 2017-06-25 RX ADMIN — FAMOTIDINE 20 MILLIGRAM(S): 10 INJECTION INTRAVENOUS at 05:05

## 2017-06-25 RX ADMIN — Medication 1: at 22:11

## 2017-06-25 RX ADMIN — OXYCODONE HYDROCHLORIDE 10 MILLIGRAM(S): 5 TABLET ORAL at 01:21

## 2017-06-25 RX ADMIN — FAMOTIDINE 20 MILLIGRAM(S): 10 INJECTION INTRAVENOUS at 18:39

## 2017-06-25 RX ADMIN — Medication 166.67 MILLIGRAM(S): at 10:07

## 2017-06-25 RX ADMIN — Medication 10 MILLIGRAM(S): at 01:20

## 2017-06-25 RX ADMIN — SODIUM CHLORIDE 125 MILLILITER(S): 9 INJECTION, SOLUTION INTRAVENOUS at 08:31

## 2017-06-25 RX ADMIN — SENNA PLUS 2 TABLET(S): 8.6 TABLET ORAL at 22:09

## 2017-06-25 NOTE — PROGRESS NOTE ADULT - SUBJECTIVE AND OBJECTIVE BOX
S: Patient seen and examined. Doing well this AM. Pain reported but controlled. No acute events overnight.    O:  Vital Signs Last 24 Hrs  T(C): 38.8, Max: 38.8 (06-25 @ 05:00)  T(F): 101.8, Max: 101.8 (06-25 @ 05:00)  HR: 112 (94 - 114)  BP: 118/69 (100/68 - 121/78)  BP(mean): --  RR: 17 (16 - 17)  SpO2: 96% (96% - 100%)    Motor: 5/5 GS/TA/EHL/FHL BL   SILT to patients baseline BL  WWP DP PT BL  Dressing C/D/I  Hemovac 0ml overnight

## 2017-06-25 NOTE — PROGRESS NOTE ADULT - ASSESSMENT
A/P: 62y s/p T10 to sacrum posterior decompression, T10 to sacrum posterior segmental instrumentation, T10 to sacrum posterior spinal fusion, pelvic fixation, and Neha osteotomy at L3-L4, L4-L5, and L5-S1.6/6/2015  -stable  -pain control  -elevate bed to 15 degrees as tolerated  -SCD  -Vanc/zoysn  -f/u blood culture, NGTD  -diet as tolerated  -f/u labs  -disposition: pending A/P: 62y s/p T10 to sacrum posterior decompression, T10 to sacrum posterior segmental instrumentation, T10 to sacrum posterior spinal fusion, pelvic fixation, and Neha osteotomy at L3-L4, L4-L5, and L5-S1.6/6/2015  -stable  -pain control  -elevate bed to 15 degrees as tolerated  -SCD   -Vanc/zoysn  -f/u blood culture, NGTD  -diet as tolerated  -f/u labs  -disposition: pending

## 2017-06-26 DIAGNOSIS — R50.9 FEVER, UNSPECIFIED: ICD-10-CM

## 2017-06-26 LAB
ANION GAP SERPL CALC-SCNC: 12 MMOL/L — SIGNIFICANT CHANGE UP (ref 5–17)
BUN SERPL-MCNC: 10 MG/DL — SIGNIFICANT CHANGE UP (ref 7–23)
CALCIUM SERPL-MCNC: 8.3 MG/DL — LOW (ref 8.4–10.5)
CHLORIDE SERPL-SCNC: 95 MMOL/L — LOW (ref 96–108)
CO2 SERPL-SCNC: 25 MMOL/L — SIGNIFICANT CHANGE UP (ref 22–31)
CREAT SERPL-MCNC: 0.8 MG/DL — SIGNIFICANT CHANGE UP (ref 0.5–1.3)
GLUCOSE SERPL-MCNC: 148 MG/DL — HIGH (ref 70–99)
HCT VFR BLD CALC: 29 % — LOW (ref 39–50)
HGB BLD-MCNC: 10.1 G/DL — LOW (ref 13–17)
MCHC RBC-ENTMCNC: 28.3 PG — SIGNIFICANT CHANGE UP (ref 27–34)
MCHC RBC-ENTMCNC: 34.8 G/DL — SIGNIFICANT CHANGE UP (ref 32–36)
MCV RBC AUTO: 81.2 FL — SIGNIFICANT CHANGE UP (ref 80–100)
PLATELET # BLD AUTO: 298 K/UL — SIGNIFICANT CHANGE UP (ref 150–400)
POTASSIUM SERPL-MCNC: 3.8 MMOL/L — SIGNIFICANT CHANGE UP (ref 3.5–5.3)
POTASSIUM SERPL-SCNC: 3.8 MMOL/L — SIGNIFICANT CHANGE UP (ref 3.5–5.3)
RBC # BLD: 3.57 M/UL — LOW (ref 4.2–5.8)
RBC # FLD: 13.8 % — SIGNIFICANT CHANGE UP (ref 10.3–16.9)
SODIUM SERPL-SCNC: 132 MMOL/L — LOW (ref 135–145)
SURGICAL PATHOLOGY STUDY: SIGNIFICANT CHANGE UP
WBC # BLD: 11.6 K/UL — HIGH (ref 3.8–10.5)
WBC # FLD AUTO: 11.6 K/UL — HIGH (ref 3.8–10.5)

## 2017-06-26 PROCEDURE — 93970 EXTREMITY STUDY: CPT | Mod: 26

## 2017-06-26 PROCEDURE — 99232 SBSQ HOSP IP/OBS MODERATE 35: CPT | Mod: GC

## 2017-06-26 RX ORDER — VANCOMYCIN HCL 1 G
1500 VIAL (EA) INTRAVENOUS EVERY 12 HOURS
Qty: 0 | Refills: 0 | Status: DISCONTINUED | OUTPATIENT
Start: 2017-06-26 | End: 2017-06-29

## 2017-06-26 RX ORDER — METOCLOPRAMIDE HCL 10 MG
10 TABLET ORAL ONCE
Qty: 0 | Refills: 0 | Status: COMPLETED | OUTPATIENT
Start: 2017-06-26 | End: 2017-06-26

## 2017-06-26 RX ORDER — MAGNESIUM HYDROXIDE 400 MG/1
30 TABLET, CHEWABLE ORAL DAILY
Qty: 0 | Refills: 0 | Status: DISCONTINUED | OUTPATIENT
Start: 2017-06-26 | End: 2017-07-05

## 2017-06-26 RX ADMIN — OXYCODONE HYDROCHLORIDE 10 MILLIGRAM(S): 5 TABLET ORAL at 13:36

## 2017-06-26 RX ADMIN — LIDOCAINE 1 PATCH: 4 CREAM TOPICAL at 01:42

## 2017-06-26 RX ADMIN — Medication 100 MILLIGRAM(S): at 13:30

## 2017-06-26 RX ADMIN — PIPERACILLIN AND TAZOBACTAM 200 GRAM(S): 4; .5 INJECTION, POWDER, LYOPHILIZED, FOR SOLUTION INTRAVENOUS at 23:04

## 2017-06-26 RX ADMIN — Medication 1 TABLET(S): at 11:41

## 2017-06-26 RX ADMIN — HYDROMORPHONE HYDROCHLORIDE 0.5 MILLIGRAM(S): 2 INJECTION INTRAMUSCULAR; INTRAVENOUS; SUBCUTANEOUS at 17:05

## 2017-06-26 RX ADMIN — Medication 2: at 11:52

## 2017-06-26 RX ADMIN — Medication 300 MILLIGRAM(S): at 23:05

## 2017-06-26 RX ADMIN — MAGNESIUM HYDROXIDE 30 MILLILITER(S): 400 TABLET, CHEWABLE ORAL at 21:20

## 2017-06-26 RX ADMIN — LIDOCAINE 1 PATCH: 4 CREAM TOPICAL at 23:05

## 2017-06-26 RX ADMIN — PIPERACILLIN AND TAZOBACTAM 200 GRAM(S): 4; .5 INJECTION, POWDER, LYOPHILIZED, FOR SOLUTION INTRAVENOUS at 06:39

## 2017-06-26 RX ADMIN — OXYCODONE HYDROCHLORIDE 10 MILLIGRAM(S): 5 TABLET ORAL at 05:26

## 2017-06-26 RX ADMIN — PIPERACILLIN AND TAZOBACTAM 200 GRAM(S): 4; .5 INJECTION, POWDER, LYOPHILIZED, FOR SOLUTION INTRAVENOUS at 13:30

## 2017-06-26 RX ADMIN — PIPERACILLIN AND TAZOBACTAM 200 GRAM(S): 4; .5 INJECTION, POWDER, LYOPHILIZED, FOR SOLUTION INTRAVENOUS at 00:30

## 2017-06-26 RX ADMIN — Medication 650 MILLIGRAM(S): at 07:44

## 2017-06-26 RX ADMIN — Medication 5 MILLIGRAM(S): at 23:04

## 2017-06-26 RX ADMIN — FAMOTIDINE 20 MILLIGRAM(S): 10 INJECTION INTRAVENOUS at 17:34

## 2017-06-26 RX ADMIN — OXYCODONE HYDROCHLORIDE 10 MILLIGRAM(S): 5 TABLET ORAL at 06:00

## 2017-06-26 RX ADMIN — PIPERACILLIN AND TAZOBACTAM 200 GRAM(S): 4; .5 INJECTION, POWDER, LYOPHILIZED, FOR SOLUTION INTRAVENOUS at 17:35

## 2017-06-26 RX ADMIN — FAMOTIDINE 20 MILLIGRAM(S): 10 INJECTION INTRAVENOUS at 06:39

## 2017-06-26 RX ADMIN — LIDOCAINE 1 PATCH: 4 CREAM TOPICAL at 11:41

## 2017-06-26 RX ADMIN — HYDROMORPHONE HYDROCHLORIDE 0.5 MILLIGRAM(S): 2 INJECTION INTRAMUSCULAR; INTRAVENOUS; SUBCUTANEOUS at 16:51

## 2017-06-26 RX ADMIN — Medication 100 MILLIGRAM(S): at 06:39

## 2017-06-26 RX ADMIN — OXYCODONE HYDROCHLORIDE 10 MILLIGRAM(S): 5 TABLET ORAL at 14:10

## 2017-06-26 RX ADMIN — Medication 300 MILLIGRAM(S): at 11:41

## 2017-06-26 RX ADMIN — OXYCODONE HYDROCHLORIDE 10 MILLIGRAM(S): 5 TABLET ORAL at 21:21

## 2017-06-26 RX ADMIN — Medication 10 MILLIGRAM(S): at 21:20

## 2017-06-26 RX ADMIN — SIMETHICONE 80 MILLIGRAM(S): 80 TABLET, CHEWABLE ORAL at 13:30

## 2017-06-26 RX ADMIN — Medication 5 MILLIGRAM(S): at 07:49

## 2017-06-26 RX ADMIN — Medication 100 MILLIGRAM(S): at 21:21

## 2017-06-26 RX ADMIN — SENNA PLUS 2 TABLET(S): 8.6 TABLET ORAL at 21:20

## 2017-06-26 NOTE — PROGRESS NOTE ADULT - SUBJECTIVE AND OBJECTIVE BOX
Interval Events: reviewed  Patient seen and examined at bedside.    Patient is a 62y old  Male who presents with a chief complaint of Back pain, RLE spasms, LLE pain for 3 weeks, getting worse. (20 Jun 2017 13:05)      PAST MEDICAL & SURGICAL HISTORY:  Benign prostatic hypertrophy without lower urinary tract symptoms: BPH (benign prostatic hyperplasia)  Type 2 diabetes mellitus: Diabetes mellitus type 2 in obese  Essential hypertension: Hypertension  History of total knee replacement: Total knee replacement status, right      MEDICATIONS:  Pulmonary:    Antimicrobials:  piperacillin/tazobactam IVPB. 3.375Gram(s) IV Intermittent every 6 hours  vancomycin  IVPB 1500milliGRAM(s) IV Intermittent every 12 hours    Anticoagulants:    Cardiac:      Allergies    No Known Allergies    Intolerances        Vital Signs Last 24 Hrs  T(C): 36.8, Max: 38.7 (06-25 @ 21:00)  T(F): 98.3, Max: 101.6 (06-25 @ 21:00)  HR: 91 (91 - 104)  BP: 116/61 (96/58 - 126/66)  BP(mean): --  RR: 17 (15 - 17)  SpO2: 98% (96% - 99%)    I & Os for current day (as of 06-26 @ 10:51)  =============================================  IN: 2150 ml / OUT: 5050 ml / NET: -2900 ml        LABS:      CBC Full  -  ( 26 Jun 2017 07:15 )  WBC Count : 11.6 K/uL  Hemoglobin : 10.1 g/dL  Hematocrit : 29.0 %  Platelet Count - Automated : 298 K/uL  Mean Cell Volume : 81.2 fL  Mean Cell Hemoglobin : 28.3 pg  Mean Cell Hemoglobin Concentration : 34.8 g/dL  Auto Neutrophil # : x  Auto Lymphocyte # : x  Auto Monocyte # : x  Auto Eosinophil # : x  Auto Basophil # : x  Auto Neutrophil % : x  Auto Lymphocyte % : x  Auto Monocyte % : x  Auto Eosinophil % : x  Auto Basophil % : x    06-26    132<L>  |  95<L>  |  10  ----------------------------<  148<H>  3.8   |  25  |  0.80    Ca    8.3<L>      26 Jun 2017 07:14      Culture - Blood (06.23.17 @ 17:31)    Specimen Source: .Blood Blood-Peripheral    Culture Results:   No growth at 2 days.                        RADIOLOGY & ADDITIONAL STUDIES (The following images were personally reviewed):  Ratliff:                           No  Urine output:               Yes          DVT prophylaxis:         Yes          Flattus:                          Yes          Bowel movement:       Yno

## 2017-06-26 NOTE — PROGRESS NOTE ADULT - ASSESSMENT
A/P: 62y s/p T10 to sacrum posterior decompression, T10 to sacrum posterior segmental instrumentation, T10 to sacrum posterior spinal fusion, pelvic fixation, and Neha osteotomy at L3-L4, L4-L5, and L5-S1.6/6/2015  -stable  -pain control  -elevate bed to 15 degrees as tolerated and advance slowly  -SCD   -Vanc/zoysn  -f/u blood culture, NGTD  -diet as tolerated  -f/u labs  -disposition: pending

## 2017-06-26 NOTE — PROGRESS NOTE ADULT - SUBJECTIVE AND OBJECTIVE BOX
CHIEF COMPLAINT:  Patient is a 62y old  Male who presents with a chief complaint of Back pain, RLE spasms, LLE pain for 3 weeks, getting worse. (20 Jun 2017 13:05)    INTERVAL HX:  Patient was seen and examined at bedside. Various MSK pains, L shoulder, knees, occasional back. Overall pain greatly improved. Afebrile.    REVIEW OF SYSTEMS:  Constitutional: no malaise/fatigue/fevers  HEENT: no headache/sore throat/rhinorrhea  Respiratory: no cough/SOB/chest pain  Cardiac: no palpitations/chest pain  Gastrointestinal: no nausea/vomiting/diarrhea/constipation  Genitourinary: no dysuria/nocturia/polyuria  Skin: no rashes    OBJECTIVE:    Vital Signs Last 24 Hrs  T(C): 37.1, Max: 38.7 (06-25 @ 21:00)  T(F): 98.8, Max: 101.6 (06-25 @ 21:00)  HR: 94 (91 - 104)  BP: 115/73 (96/58 - 126/66)  BP(mean): --  RR: 16 (16 - 17)  SpO2: 95% (95% - 99%)    PHYSICAL EXAM:  Gen:       well-appearing, no acute distress  Skin:       warm, dry, no rash  HEENT:  moist mucous membranes, oropharynx clear, nares clear, no septal deviation  Cardiac: regular rate and rhythm, S1/S2 present, no murmur/gallop/rub  Pulm:     clear to auscultation bilaterally, no wheezes/crackles  Abd:       soft/nontender/nondistended, normoactive bowel sounds    ANTIBIOTICS:  piperacillin/tazobactam IVPB. 3.375Gram(s) IV Intermittent every 6 hours  vancomycin  IVPB 1500milliGRAM(s) IV Intermittent every 12 hours      Allergies:  No Known Allergies        LABS:                        10.1   11.6  )-----------( 298      ( 26 Jun 2017 07:15 )             29.0    Mean Cell Volume: 81.2 fL (06-26 @ 07:15)    06-26    132<L>  |  95<L>  |  10  ----------------------------<  148<H>  3.8   |  25  |  0.80    Ca    8.3<L>      26 Jun 2017 07:14            MICROBIOLOGY: REVIEWED      RADIOLOGY: REVIEWED CHIEF COMPLAINT:  Patient is a 62y old  Male who presents with a chief complaint of Back pain, RLE spasms, LLE pain for 3 weeks, getting worse. (20 Jun 2017 13:05)    INTERVAL HX:  Patient was seen and examined at bedside. Various MSK pains, L shoulder, knees, occasional back. Overall pain greatly improved. Afebrile.    REVIEW OF SYSTEMS:  Constitutional: no malaise/fatigue/fevers  HEENT: no headache/sore throat/rhinorrhea  Respiratory: no cough/SOB/chest pain  Cardiac: no palpitations/chest pain  Gastrointestinal: no nausea/vomiting/diarrhea/constipation  Genitourinary: no dysuria/nocturia/polyuria  Skin: no rashes    OBJECTIVE:    Vital Signs Last 24 Hrs  T(C): 37.1, Max: 38.7 (06-25 @ 21:00)  T(F): 98.8, Max: 101.6 (06-25 @ 21:00)  HR: 94 (91 - 104)  BP: 115/73 (96/58 - 126/66)  BP(mean): --  RR: 16 (16 - 17)  SpO2: 95% (95% - 99%)    PHYSICAL EXAM:  Gen:       well-appearing, no acute distress  Skin:       warm, dry, no rash, dressing on R should c/d/i  HEENT:  moist mucous membranes, oropharynx clear, nares clear, no septal deviation  Vancomycin Level, Trough (06.25.17 @ 07:56)    Vancomycin Level, Trough: 8.0: Vancomycin trough levels should be rapidly reached and maintained at  15-20 ug/ml for life threatening MRSA  infections such as sepsis, endocarditis, osteomyelitis and pneumonia. A  first trough level should be drawn  before the 3rd or 4th dose.  Risk8.0:  of renal toxicity is increased for levels >15 ug/ml, in patients on  other nephrotoxic drugs, who are  hemodynamically unstable, have unstable renal function, or are on  Vancomycin therapy for >14 days. Renal function with  creatinine levels should b8.0: e monitored for those patients. ug/mL    Cardiac:  regular rate and rhythm, S1/S2 present, no murmur/gallop/rub  Pulm:     clear to auscultation bilaterally, no wheezes/crackles  Abd:       soft/nontender/nondistended, normoactive bowel sounds    ANTIBIOTICS:  piperacillin/tazobactam IVPB. 3.375Gram(s) IV Intermittent every 6 hours  vancomycin  IVPB 1500milliGRAM(s) IV Intermittent every 12 hours      Allergies:  No Known Allergies        LABS:                        10.1   11.6  )-----------( 298      ( 26 Jun 2017 07:15 )             29.0    Mean Cell Volume: 81.2 fL (06-26 @ 07:15)    06-26    132<L>  |  95<L>  |  10  ----------------------------<  148<H>  3.8   |  25  |  0.80    Ca    8.3<L>      26 Jun 2017 07:14    Sedimentation Rate, Erythrocyte (06.25.17 @ 13:12)    Sedimentation Rate, Erythrocyte: 83 mm/Hr    C-Reactive Protein, Serum (06.25.17 @ 13:12)    C-Reactive Protein, Serum: 21.10 mg/dL    Vancomycin Level, Trough (06.25.17 @ 07:56)    Vancomycin Level, Trough: 8.0      MICROBIOLOGY: REVIEWED    Culture - Blood (06.23.17 @ 17:31)    Specimen Source: .Blood Blood-Peripheral    Culture Results:   No growth at 2 days.    Culture - Blood (06.23.17 @ 16:25)    Specimen Source: .Blood Blood-Peripheral    Culture Results:   No growth at 2 days.    Culture - Urine (06.23.17 @ 15:19)    Specimen Source: .Urine Catheterized    Culture Results:   No growth      RADIOLOGY: REVIEWED

## 2017-06-26 NOTE — PROGRESS NOTE ADULT - SUBJECTIVE AND OBJECTIVE BOX
Pain Management Progress Note - [facility] [phone]    HPI:  patient seen and examined this morning. Complains of headache that improves with tylenol.  Used prn pain medications minimally over the last 24 hours- 10mg oxy 3 times/24hrs.  No new complaints or side effects from pain meds. states that valium from ortho team helps with his LE spasms            Pertinent PMH: Pain at: _x__Back ___Neck___Knee ___Hip ___Shoulder ___ Opioid tolerance     Pain is ___ sharp ____dull ___burning _x__achy ___ Intensity: ____ mild ____mod ____severe     Location __x___surgical site _____cervical __x___lumbar ____abd _____upper ext____lower ext headache    Worse with __x__activity __x__movement _____physical therapy___ Rest    Improved with _x___medication _x___rest ____physical therapy          ROS: Const:  __-_febrile   Eyes:___ENT:___CV: ___chest pain  Resp: __-__sob  GI:_-__nausea _-__vomiting ____abd pain ___npo ___clears ___full diet __bm  :___ Musk: _+__pain ___spasm  Skin:___ Neuro:  _-__wvwrogmi__-_oybsnjkyr__-__ numbness -___weakness __-_paresthesia  Psych:___anxiety  Endo:___ Heme:___Allergy:___      Vital Signs Last 24 Hrs  T(C): 36.8, Max: 38.7 (06-25 @ 21:00)  T(F): 98.3, Max: 101.6 (06-25 @ 21:00)  HR: 91 (91 - 104)  BP: 116/61 (96/58 - 126/66)  BP(mean): --  RR: 17 (15 - 17)  SpO2: 98% (96% - 99%)     PHYSICAL EXAM:  Gen Appearance: _x__no acute distress _x__appropriate         Neuro: ___SILT feet__x__ EOM Intact Psych: AAOX_3_, _x__mood/affect appropriate        Eyes: __x_conjunctiva WNL  ____x_ Pupils equal and round        ENT: _x__ears and nose atraumatic__x_ Hearing grossly intact        Neck: ___trachea midline, no visible masses ___thyroid without palpable mass    Resp: _x__Nml WOB____No tactile fremitus ___clear to auscultation    Cardio: __x_extremities free from edema ____pedal pulses palpable    GI/Abdomen: __x_soft _x____ Nontender__x____Nondistended_____HSM    Lymphatic: ___no palpable nodes in neck  ___no palpable nodes calves and feet    Skin/Wound: ___Incision, ___Dressing c/d/i,   __x__surrounding tissues soft,  __x_drain/chest tube present____    Muscular: EHL _5__/5  Gastrocnemius__5_/5    ___absent clubbing/cyanosis         ASSESSMENT:  This is a 62y old Male with a history of:  ACUTE LEFT-SIDED LOW BACK PAIN WITH LEFT-SIDED  Benign prostatic hypertrophy without lower urinary tract symptoms  Type 2 diabetes mellitus  Essential hypertension  Hypotension  Anemia due to blood loss  Type 2 diabetes mellitus  History of total knee replacement  BACK PAIN and is S/P T10-pelvis PSF and pain is controlled on the current regimen.        Recommended Treatment PLAN:  1.  Dilaudid 0.5 mg IV q2h prn  2.  Oxycodone 5-10 mg po q4h prn  3.  Lidoderm patch  4.  Valium as per ortho

## 2017-06-26 NOTE — PROGRESS NOTE ADULT - ASSESSMENT
62y old Male with a history of Anemia, Type 2 diabetes mellitus, TKR, BPH, HTN, s/p T10 to sacrum posterior decompression, T10 to sacrum posterior segmental instrumentation, T10 to sacrum posterior spinal fusion, pelvic fixation, and Neha osteotomy at L3-L4, L4-L5, and L5-[id number] with worsening back pain, now s/p revision s/p T10-pelvis PSF

## 2017-06-26 NOTE — PROGRESS NOTE ADULT - SUBJECTIVE AND OBJECTIVE BOX
S: Patient seen and examined. Doing well this AM. Pain reported but controlled. No acute events overnight.    O:  Vital Signs Last 24 Hrs  T(C): 37.9  T(F): 100.2, Max: 101.6 (06-25 @ 21:00)  HR: 91 (91 - 104)  BP: 105/67  BP(mean): --  RR: 16 (15 - 17)  SpO2: 99% (96% - 100%)  Wt(kg): --    Motor: weak dorsiflexion of ankles R>L, 5/5 proximally  SILT to patients baseline BL  WWP DP PT BL  Dressing C/D/I

## 2017-06-26 NOTE — PROGRESS NOTE ADULT - PROBLEM SELECTOR PLAN 1
-Patient continue to have recurrent fevers post operatively. No clear source.  -Given repeat fevers with no clear source, please repeat CT Lumbar and Thoracic spine to assess for occult fluid collection or abscess.  -Send repeat Blood culture, urine culture, Chest x-ray when patient is febrile.  -Remove Ratliff if possible  -C/w Zosyn 3.375gr IV q 6h  -Agree with increased Vancomycin dosage given trough. Please repeat trough prior to next dose.    -Plan was discussed with the patient.  -Case discussed with Dr Pardo.

## 2017-06-27 LAB
ANION GAP SERPL CALC-SCNC: 10 MMOL/L — SIGNIFICANT CHANGE UP (ref 5–17)
BUN SERPL-MCNC: 10 MG/DL — SIGNIFICANT CHANGE UP (ref 7–23)
CALCIUM SERPL-MCNC: 8.3 MG/DL — LOW (ref 8.4–10.5)
CHLORIDE SERPL-SCNC: 95 MMOL/L — LOW (ref 96–108)
CO2 SERPL-SCNC: 25 MMOL/L — SIGNIFICANT CHANGE UP (ref 22–31)
CREAT SERPL-MCNC: 0.8 MG/DL — SIGNIFICANT CHANGE UP (ref 0.5–1.3)
GLUCOSE SERPL-MCNC: 134 MG/DL — HIGH (ref 70–99)
HCT VFR BLD CALC: 28.9 % — LOW (ref 39–50)
HGB BLD-MCNC: 9.9 G/DL — LOW (ref 13–17)
MCHC RBC-ENTMCNC: 27.9 PG — SIGNIFICANT CHANGE UP (ref 27–34)
MCHC RBC-ENTMCNC: 34.3 G/DL — SIGNIFICANT CHANGE UP (ref 32–36)
MCV RBC AUTO: 81.4 FL — SIGNIFICANT CHANGE UP (ref 80–100)
PLATELET # BLD AUTO: 307 K/UL — SIGNIFICANT CHANGE UP (ref 150–400)
POTASSIUM SERPL-MCNC: 3.8 MMOL/L — SIGNIFICANT CHANGE UP (ref 3.5–5.3)
POTASSIUM SERPL-SCNC: 3.8 MMOL/L — SIGNIFICANT CHANGE UP (ref 3.5–5.3)
RBC # BLD: 3.55 M/UL — LOW (ref 4.2–5.8)
RBC # FLD: 13.7 % — SIGNIFICANT CHANGE UP (ref 10.3–16.9)
SODIUM SERPL-SCNC: 130 MMOL/L — LOW (ref 135–145)
VANCOMYCIN TROUGH SERPL-MCNC: 17.7 UG/ML — SIGNIFICANT CHANGE UP (ref 10–20)
WBC # BLD: 9.6 K/UL — SIGNIFICANT CHANGE UP (ref 3.8–10.5)
WBC # FLD AUTO: 9.6 K/UL — SIGNIFICANT CHANGE UP (ref 3.8–10.5)

## 2017-06-27 PROCEDURE — 99232 SBSQ HOSP IP/OBS MODERATE 35: CPT | Mod: GC

## 2017-06-27 RX ORDER — CYCLOBENZAPRINE HYDROCHLORIDE 10 MG/1
5 TABLET, FILM COATED ORAL EVERY 8 HOURS
Qty: 0 | Refills: 0 | Status: DISCONTINUED | OUTPATIENT
Start: 2017-06-27 | End: 2017-07-06

## 2017-06-27 RX ORDER — METOCLOPRAMIDE HCL 10 MG
10 TABLET ORAL EVERY 8 HOURS
Qty: 0 | Refills: 0 | Status: DISCONTINUED | OUTPATIENT
Start: 2017-06-27 | End: 2017-07-06

## 2017-06-27 RX ADMIN — OXYCODONE HYDROCHLORIDE 10 MILLIGRAM(S): 5 TABLET ORAL at 02:32

## 2017-06-27 RX ADMIN — CYCLOBENZAPRINE HYDROCHLORIDE 5 MILLIGRAM(S): 10 TABLET, FILM COATED ORAL at 21:14

## 2017-06-27 RX ADMIN — OXYCODONE HYDROCHLORIDE 10 MILLIGRAM(S): 5 TABLET ORAL at 19:15

## 2017-06-27 RX ADMIN — Medication 1 TABLET(S): at 16:10

## 2017-06-27 RX ADMIN — LIDOCAINE 1 PATCH: 4 CREAM TOPICAL at 11:36

## 2017-06-27 RX ADMIN — Medication 100 MILLIGRAM(S): at 21:14

## 2017-06-27 RX ADMIN — PIPERACILLIN AND TAZOBACTAM 200 GRAM(S): 4; .5 INJECTION, POWDER, LYOPHILIZED, FOR SOLUTION INTRAVENOUS at 11:36

## 2017-06-27 RX ADMIN — SENNA PLUS 2 TABLET(S): 8.6 TABLET ORAL at 21:14

## 2017-06-27 RX ADMIN — Medication 1 TABLET(S): at 15:10

## 2017-06-27 RX ADMIN — Medication 10 MILLIGRAM(S): at 23:38

## 2017-06-27 RX ADMIN — HYDROMORPHONE HYDROCHLORIDE 0.5 MILLIGRAM(S): 2 INJECTION INTRAMUSCULAR; INTRAVENOUS; SUBCUTANEOUS at 21:45

## 2017-06-27 RX ADMIN — MAGNESIUM HYDROXIDE 30 MILLILITER(S): 400 TABLET, CHEWABLE ORAL at 18:09

## 2017-06-27 RX ADMIN — PIPERACILLIN AND TAZOBACTAM 200 GRAM(S): 4; .5 INJECTION, POWDER, LYOPHILIZED, FOR SOLUTION INTRAVENOUS at 18:10

## 2017-06-27 RX ADMIN — OXYCODONE HYDROCHLORIDE 10 MILLIGRAM(S): 5 TABLET ORAL at 23:47

## 2017-06-27 RX ADMIN — OXYCODONE HYDROCHLORIDE 10 MILLIGRAM(S): 5 TABLET ORAL at 07:45

## 2017-06-27 RX ADMIN — FAMOTIDINE 20 MILLIGRAM(S): 10 INJECTION INTRAVENOUS at 18:09

## 2017-06-27 RX ADMIN — OXYCODONE HYDROCHLORIDE 10 MILLIGRAM(S): 5 TABLET ORAL at 18:15

## 2017-06-27 RX ADMIN — Medication 1 TABLET(S): at 11:36

## 2017-06-27 RX ADMIN — OXYCODONE HYDROCHLORIDE 10 MILLIGRAM(S): 5 TABLET ORAL at 03:00

## 2017-06-27 RX ADMIN — PIPERACILLIN AND TAZOBACTAM 200 GRAM(S): 4; .5 INJECTION, POWDER, LYOPHILIZED, FOR SOLUTION INTRAVENOUS at 23:38

## 2017-06-27 RX ADMIN — HYDROMORPHONE HYDROCHLORIDE 0.5 MILLIGRAM(S): 2 INJECTION INTRAMUSCULAR; INTRAVENOUS; SUBCUTANEOUS at 21:13

## 2017-06-27 RX ADMIN — PIPERACILLIN AND TAZOBACTAM 200 GRAM(S): 4; .5 INJECTION, POWDER, LYOPHILIZED, FOR SOLUTION INTRAVENOUS at 05:08

## 2017-06-27 RX ADMIN — Medication 10 MILLIGRAM(S): at 18:15

## 2017-06-27 RX ADMIN — OXYCODONE HYDROCHLORIDE 10 MILLIGRAM(S): 5 TABLET ORAL at 12:54

## 2017-06-27 RX ADMIN — OXYCODONE HYDROCHLORIDE 10 MILLIGRAM(S): 5 TABLET ORAL at 06:45

## 2017-06-27 RX ADMIN — Medication 100 MILLIGRAM(S): at 05:08

## 2017-06-27 RX ADMIN — Medication 300 MILLIGRAM(S): at 12:36

## 2017-06-27 RX ADMIN — Medication 1: at 18:09

## 2017-06-27 RX ADMIN — Medication 100 MILLIGRAM(S): at 14:20

## 2017-06-27 RX ADMIN — FAMOTIDINE 20 MILLIGRAM(S): 10 INJECTION INTRAVENOUS at 05:08

## 2017-06-27 RX ADMIN — OXYCODONE HYDROCHLORIDE 10 MILLIGRAM(S): 5 TABLET ORAL at 11:54

## 2017-06-27 NOTE — PROGRESS NOTE ADULT - SUBJECTIVE AND OBJECTIVE BOX
Pain Management Progress Note - [facility] [phone]    HPI:  Pt. complains of incisional back pain and muscle spasms in the right thigh and back.  States valium was not helping the muscle spasm last night.            Pertinent PMH: Pain at: __x_Back ___Neck___Knee ___Hip ___Shoulder ___ Opioid tolerance    Pain is ___ sharp ____dull ___burning __x_achy ___ Intensity: ____ mild ____mod ____severe     Location __x___surgical site _____cervical __x___lumbar ____abd _____upper ext_x___lower ext    Worse with __x__activity __x__movement _____physical therapy___ Rest    Improved with _x___medication _x___rest ____physical therapy    ketorolac   Injectable  diazepam  Injectable  oxyCODONE  5 mG/acetaminophen 325 mG  oxyCODONE  5 mG/acetaminophen 325 mG  lactated ringers.  acetaminophen   Tablet  acetaminophen   Tablet.  oxyCODONE IR  oxyCODONE IR  morphine  - Injectable  magnesium hydroxide Suspension  bisacodyl Suppository  docusate sodium  lactated ringers.  HYDROmorphone  Injectable  ceFAZolin   IVPB  aluminum hydroxide/magnesium hydroxide/simethicone Suspension  famotidine    Tablet  ondansetron Injectable  docusate sodium  senna  multivitamin  HYDROmorphone PCA (1 mG/mL)  HYDROmorphone PCA (1 mG/mL) Rescue Clinician Bolus  naloxone Injectable  acetaminophen  IVPB.  (ADM OVERRIDE)  lactated ringers Bolus  sodium chloride 0.9% Bolus  diazepam    Tablet  lidocaine   Patch  acetaminophen   Tablet  acetaminophen   Tablet.  lidocaine   Patch  oxyCODONE IR  oxyCODONE IR  HYDROmorphone  Injectable  trimethoprim  160 mG/sulfamethoxazole 800 mG  insulin lispro (HumaLOG) corrective regimen sliding scale  dextrose 5%.  dextrose Gel  dextrose 50% Injectable  dextrose 50% Injectable  dextrose 50% Injectable  glucagon  Injectable  piperacillin/tazobactam IVPB.  piperacillin/tazobactam IVPB.  vancomycin  IVPB  vancomycin  IVPB  zaleplon  vancomycin  IVPB  sodium chloride 0.65% Nasal  acetaminophen 325 mG/butalbital 50 mG/caffeine 40 mG  metoclopramide  metoclopramide Injectable  vancomycin  IVPB  metoclopramide Injectable  magnesium hydroxide Suspension  metoclopramide Injectable      ROS: Const:  ___febrile   Eyes:___ENT:___CV: ___chest pain  Resp: ____sob  GI:_-__nausea _-__vomiting _-___abd pain ___npo ___clears ___full diet __bm  :___ Musk: ___pain ___spasm  Skin:___ Neuro:  __-_nkouxcnp_-__mhjcfanqk___-_ numbness _-__weakness ___paresthesia  Psych:___anxiety  Endo:___ Heme:___Allergy:___      06-27 @ 06:4294 mL/min/1.73M2          Hemoglobin: 9.9 g/dL (06-27 @ 06:42)  Hemoglobin: 10.1 g/dL (06-26 @ 07:15)        T(C): 37.1 (06-27-17 @ 14:42), Max: 37.4 (06-26-17 @ 17:30)  HR: 93 (06-27-17 @ 14:42) (81 - 93)  BP: 119/79 (06-27-17 @ 14:42) (110/74 - 127/76)  RR: 16 (06-27-17 @ 14:42) (16 - 17)  SpO2: 98% (06-27-17 @ 14:42) (97% - 100%)  Wt(kg): --     PHYSICAL EXAM:  Gen Appearance: _x__no acute distress _x__appropriate         Neuro: ___SILT feet__x__ EOM Intact Psych: AAOX_3_, _x__mood/affect appropriate        Eyes: _x__conjunctiva WNL  __x___ Pupils equal and round        ENT: _x__ears and nose atraumatic_x__ Hearing grossly intact        Neck: ___trachea midline, no visible masses ___thyroid without palpable mass    Resp: _x__Nml WOB____No tactile fremitus ___clear to auscultation    Cardio: ___extremities free from edema ____pedal pulses palpable    GI/Abdomen: _x__soft __x___ Nontender______Nondistended_____HSM    Lymphatic: ___no palpable nodes in neck  ___no palpable nodes calves and feet    Skin/Wound: ___Incision, ___Dressing c/d/i,   ____surrounding tissues soft,  ___drain/chest tube present____    Muscular: EHL _5__/5  Gastrocnemius___/5    _x__absent clubbing/cyanosis         ASSESSMENT:  This is a 62y old Male with a history of:  ACUTE LEFT-SIDED LOW BACK PAIN WITH LEFT-SIDED  No h/o HF  Family history of diabetes mellitus (Sibling)  Benign prostatic hypertrophy without lower urinary tract symptoms  Type 2 diabetes mellitus  Essential hypertension  Acute left-sided low back pain with left-sided sciatica  Fever  Fever postop  Hypotension  Anemia due to blood loss  Preoperative clearance  Type 2 diabetes mellitus  Essential hypertension  Benign prostatic hypertrophy without lower urinary tract symptoms  Acute left-sided low back pain with left-sided sciatica  History of total knee replacement  and back pain S/P T10-pelvis PSF and is doing well post op.        Recommended Treatment PLAN:  1.  Oxycodone 5-10 mg po q4h prn  2.  Dilaudid 0.5 mg IV q2h prn  3.  D/C valium and start flexeril 5 mg po q8h prn  4.  lidoderm patch

## 2017-06-27 NOTE — PROVIDER CONTACT NOTE (OTHER) - ACTION/TREATMENT ORDERED:
Ratliff catheter ordere renewed.
Such MD was sent a text message through [email or web address]
pt is being followed by Infectious diease. awaiting for their recommendation for management of fever. will recheck temp

## 2017-06-27 NOTE — PROVIDER CONTACT NOTE (OTHER) - ASSESSMENT
Pt has had arellano catheter since 6/20/17. Pt is still on bedrest. Asked whether to continue arellano catheter.
pt felt " hot" otherwise no complaint

## 2017-06-27 NOTE — PHYSICAL THERAPY INITIAL EVALUATION ADULT - LIGHT TOUCH SENSATION, LLE, REHAB EVAL
patient reports slight numbness in bilateral feet which he attributes to swelling in the feet. Sensation in lower leg and knee WNL./mild impairment

## 2017-06-27 NOTE — PROGRESS NOTE ADULT - PROBLEM SELECTOR PLAN 1
the patient is stable postoperatively. There was increase in the blood loss during surgery. The pain is controlled. Increase activity as the leg week

## 2017-06-27 NOTE — PROGRESS NOTE ADULT - SUBJECTIVE AND OBJECTIVE BOX
POST OPERATIVE DAY #: 7  STATUS POST: L3-S1 osteotomy                        SUBJECTIVE: Patient seen and examined.     Pain:  well controlled      OBJECTIVE:     Vital Signs Last 24 Hrs  T(C): 37.1 (27 Jun 2017 14:42), Max: 37.4 (26 Jun 2017 17:30)  T(F): 98.8 (27 Jun 2017 14:42), Max: 99.4 (26 Jun 2017 17:30)  HR: 93 (27 Jun 2017 14:42) (81 - 93)  BP: 119/79 (27 Jun 2017 14:42) (110/74 - 127/76)  BP(mean): --  RR: 16 (27 Jun 2017 14:42) (16 - 17)  SpO2: 98% (27 Jun 2017 14:42) (97% - 100%)    Affected extremity:          Dressing: clean/dry/intact          Sensation: intact to light touch to baseline         warm, well-perfused; capillary refill < 3 seconds              I&O's Detail    26 Jun 2017 07:01  -  27 Jun 2017 07:00  --------------------------------------------------------  IN:    IV PiggyBack: 700 mL    Oral Fluid: 120 mL  Total IN: 820 mL    OUT:    Indwelling Catheter - Urethral: 4450 mL  Total OUT: 4450 mL    Total NET: -3630 mL      27 Jun 2017 07:01  -  27 Jun 2017 14:56  --------------------------------------------------------  IN:    Oral Fluid: 240 mL  Total IN: 240 mL    OUT:    Indwelling Catheter - Urethral: 1600 mL  Total OUT: 1600 mL    Total NET: -1360 mL          LABS:                        9.9    9.6   )-----------( 307      ( 27 Jun 2017 06:42 )             28.9     06-27    130<L>  |  95<L>  |  10  ----------------------------<  134<H>  3.8   |  25  |  0.80    Ca    8.3<L>      27 Jun 2017 06:42            MEDICATIONS:  piperacillin/tazobactam IVPB. 3.375 Gram(s) IV Intermittent every 6 hours  vancomycin  IVPB 1500 milliGRAM(s) IV Intermittent every 12 hours    ondansetron Injectable 4 milliGRAM(s) IV Push every 6 hours PRN  diazepam    Tablet 5 milliGRAM(s) Oral every 8 hours PRN  acetaminophen   Tablet 650 milliGRAM(s) Oral every 6 hours PRN  acetaminophen   Tablet. 650 milliGRAM(s) Oral every 6 hours PRN  oxyCODONE IR 5 milliGRAM(s) Oral every 4 hours PRN  oxyCODONE IR 10 milliGRAM(s) Oral every 4 hours PRN  HYDROmorphone  Injectable 0.5 milliGRAM(s) IV Push every 2 hours PRN  zaleplon 5 milliGRAM(s) Oral at bedtime PRN  acetaminophen 325 mG/butalbital 50 mG/caffeine 40 mG 1 Tablet(s) Oral every 6 hours PRN        RADIOLOGY & ADDITIONAL STUDIES:    ASSESSMENT AND PLAN:     A/P :  62y Male s/p Rev T10-Pelvis PSF 6/20/17        -    Pain control + bowel regimen  -    DVT ppx: SCDs    -    Periop abx    -    ADAT  -    Check AM labs  -    Weight bearing status:   WBAT, OOB with PT today    -    Physical Therapy  -    Resume home meds  -    Dispo planning POST OPERATIVE DAY #: 7  STATUS POST: L3-S1 osteotomy                        SUBJECTIVE: Patient seen and examined.     Pain:  well controlled      OBJECTIVE:     Vital Signs Last 24 Hrs  T(C): 37.1 (27 Jun 2017 14:42), Max: 37.4 (26 Jun 2017 17:30)  T(F): 98.8 (27 Jun 2017 14:42), Max: 99.4 (26 Jun 2017 17:30)  HR: 93 (27 Jun 2017 14:42) (81 - 93)  BP: 119/79 (27 Jun 2017 14:42) (110/74 - 127/76)  BP(mean): --  RR: 16 (27 Jun 2017 14:42) (16 - 17)  SpO2: 98% (27 Jun 2017 14:42) (97% - 100%)    PE:          Dressing: clean/dry/intact   b/l LE:         Sensation: intact to light touch to baseline         warm, well-perfused; capillary refill < 3 seconds              I&O's Detail    26 Jun 2017 07:01  -  27 Jun 2017 07:00  --------------------------------------------------------  IN:    IV PiggyBack: 700 mL    Oral Fluid: 120 mL  Total IN: 820 mL    OUT:    Indwelling Catheter - Urethral: 4450 mL  Total OUT: 4450 mL    Total NET: -3630 mL      27 Jun 2017 07:01  -  27 Jun 2017 14:56  --------------------------------------------------------  IN:    Oral Fluid: 240 mL  Total IN: 240 mL    OUT:    Indwelling Catheter - Urethral: 1600 mL  Total OUT: 1600 mL    Total NET: -1360 mL          LABS:                        9.9    9.6   )-----------( 307      ( 27 Jun 2017 06:42 )             28.9     06-27    130<L>  |  95<L>  |  10  ----------------------------<  134<H>  3.8   |  25  |  0.80    Ca    8.3<L>      27 Jun 2017 06:42            MEDICATIONS:  piperacillin/tazobactam IVPB. 3.375 Gram(s) IV Intermittent every 6 hours  vancomycin  IVPB 1500 milliGRAM(s) IV Intermittent every 12 hours    ondansetron Injectable 4 milliGRAM(s) IV Push every 6 hours PRN  diazepam    Tablet 5 milliGRAM(s) Oral every 8 hours PRN  acetaminophen   Tablet 650 milliGRAM(s) Oral every 6 hours PRN  acetaminophen   Tablet. 650 milliGRAM(s) Oral every 6 hours PRN  oxyCODONE IR 5 milliGRAM(s) Oral every 4 hours PRN  oxyCODONE IR 10 milliGRAM(s) Oral every 4 hours PRN  HYDROmorphone  Injectable 0.5 milliGRAM(s) IV Push every 2 hours PRN  zaleplon 5 milliGRAM(s) Oral at bedtime PRN  acetaminophen 325 mG/butalbital 50 mG/caffeine 40 mG 1 Tablet(s) Oral every 6 hours PRN        RADIOLOGY & ADDITIONAL STUDIES:    ASSESSMENT AND PLAN:     A/P :  62y Male s/p Rev T10-Pelvis PSF 6/20/17        -    Pain control + bowel regimen  -    DVT ppx: SCDs    -    Periop abx    -    ADAT  -    Check AM labs  -    Weight bearing status:   WBAT, OOB with PT today    -    Physical Therapy  -    Resume home meds  -    Dispo planning

## 2017-06-27 NOTE — PHYSICAL THERAPY INITIAL EVALUATION ADULT - CRITERIA FOR SKILLED THERAPEUTIC INTERVENTIONS
therapy frequency/impairments found/anticipated equipment needs at discharge/anticipated discharge recommendation/rehab potential

## 2017-06-27 NOTE — PROGRESS NOTE ADULT - SUBJECTIVE AND OBJECTIVE BOX
Patient seen and examined at bedside.     SUBJECTIVE: No acute events overnight.  Pain tolerable.    Denies Chest Pain/SOB.    Denies Headache.    Denies Nausea/vomiting.      OBJECTIVE:   T(C): 36.6 (06-27-17 @ 04:52), Max: 37.4 (06-26-17 @ 17:30)  T(F): 97.9 (06-27-17 @ 04:52), Max: 99.4 (06-26-17 @ 17:30)  HR: 81 (06-27-17 @ 04:52) (81 - 94)  BP: 110/74 (06-27-17 @ 04:52) (110/74 - 123/78)  RR:  (16 - 17)  SpO2:  (95% - 100%)  Wt(kg): --    NAD, non labored respirations  Affected extremity:          Dressing: clean/dry/intact          Drains: none         Sensation: [x ] intact to light touch  [ ] decreased                              Vascular: [x ] warm well perfused; capillary refill <3 seconds          Motor exam: [x ]         [ ] Upper extremity                   Earnest (c5)   Bi(c5/6)   WE(c6)   EE(c7)    (c8)                                                R           5              5            5             5             5                                               L            5              5            5             5            5         [x ] Lower extremity                   IP(L2)     Q(L3)     TA(L4)     EHL(L5)     GS(s1)                                                 R          5           5          5            3              3                                               L           5           5         5             2              2                                     9.9<L>  9.6   )-------------------( 307      ( 06-27 @ 06:42 )                 28.9<L>    I&O's Detail    26 Jun 2017 07:01  -  27 Jun 2017 07:00  --------------------------------------------------------  IN:    IV PiggyBack: 700 mL    Oral Fluid: 120 mL  Total IN: 820 mL    OUT:    Indwelling Catheter - Urethral: 4450 mL  Total OUT: 4450 mL    Total NET: -3630 mL          A/P :  62y Male s/p Rev T10-Pelvis PSF 6/20/17      -    Pain control + bowel regimen  -    DVT ppx: SCDs    -    Periop abx    -    ADAT  -    Check AM labs  -    Weight bearing status:   WBAT        -    Physical Therapy  -    Resume home meds  -    Dispo planning

## 2017-06-27 NOTE — PHYSICAL THERAPY INITIAL EVALUATION ADULT - LIGHT TOUCH SENSATION, RLE, REHAB EVAL
mild impairment/patient reports slight numbness in bilateral feet which he attributes to swelling in the feet. Sensation in lower leg and knee WNL

## 2017-06-27 NOTE — PHYSICAL THERAPY INITIAL EVALUATION ADULT - GAIT DISTANCE, PT EVAL
4 side steps along edge of bed (to the right). Limited by B LE weakness. Able to lift left foot to clear floor when taking a step, however right foot shuffling noted.

## 2017-06-27 NOTE — PHYSICAL THERAPY INITIAL EVALUATION ADULT - ADDITIONAL COMMENTS
Patient states that he lives in an elevator building with 1 step to enter; then elevator access to apartment. Patient states that he was intermittently using a rolling walker and cane for community ambulation prior to this surgery. States that he lives with family and will have assistance upon discharge.

## 2017-06-27 NOTE — PHYSICAL THERAPY INITIAL EVALUATION ADULT - PERTINENT HX OF CURRENT PROBLEM, REHAB EVAL
62y Male s/p T10 to sacrum posterior decompression, T10 to sacrum posterior segmental instrumentation, T10 to sacrum posterior spinal fusion, pelvic fixation, and Neha osteotomy at L3-L4, L4-L5, and L5-S1.6/6/2015. Presented to ED with Jarret fracture on the R side. Now s/p Rev T10-Pelvis PSF 6/20/17. Has been on bedrest since surgery.

## 2017-06-28 DIAGNOSIS — R33.9 RETENTION OF URINE, UNSPECIFIED: ICD-10-CM

## 2017-06-28 DIAGNOSIS — E87.1 HYPO-OSMOLALITY AND HYPONATREMIA: ICD-10-CM

## 2017-06-28 LAB
ANION GAP SERPL CALC-SCNC: 16 MMOL/L — SIGNIFICANT CHANGE UP (ref 5–17)
BUN SERPL-MCNC: 13 MG/DL — SIGNIFICANT CHANGE UP (ref 7–23)
CALCIUM SERPL-MCNC: 8.7 MG/DL — SIGNIFICANT CHANGE UP (ref 8.4–10.5)
CHLORIDE SERPL-SCNC: 91 MMOL/L — LOW (ref 96–108)
CO2 SERPL-SCNC: 19 MMOL/L — LOW (ref 22–31)
CREAT SERPL-MCNC: 0.9 MG/DL — SIGNIFICANT CHANGE UP (ref 0.5–1.3)
CRP SERPL-MCNC: 13 MG/DL — HIGH (ref 0–0.4)
CULTURE RESULTS: SIGNIFICANT CHANGE UP
CULTURE RESULTS: SIGNIFICANT CHANGE UP
ERYTHROCYTE [SEDIMENTATION RATE] IN BLOOD: 85 MM/HR — HIGH
GLUCOSE SERPL-MCNC: 222 MG/DL — HIGH (ref 70–99)
HCT VFR BLD CALC: 31.8 % — LOW (ref 39–50)
HGB BLD-MCNC: 11.2 G/DL — LOW (ref 13–17)
MCHC RBC-ENTMCNC: 28.6 PG — SIGNIFICANT CHANGE UP (ref 27–34)
MCHC RBC-ENTMCNC: 35.2 G/DL — SIGNIFICANT CHANGE UP (ref 32–36)
MCV RBC AUTO: 81.3 FL — SIGNIFICANT CHANGE UP (ref 80–100)
PLATELET # BLD AUTO: 359 K/UL — SIGNIFICANT CHANGE UP (ref 150–400)
POTASSIUM SERPL-MCNC: 4.2 MMOL/L — SIGNIFICANT CHANGE UP (ref 3.5–5.3)
POTASSIUM SERPL-SCNC: 4.2 MMOL/L — SIGNIFICANT CHANGE UP (ref 3.5–5.3)
RBC # BLD: 3.91 M/UL — LOW (ref 4.2–5.8)
RBC # FLD: 14.1 % — SIGNIFICANT CHANGE UP (ref 10.3–16.9)
SODIUM SERPL-SCNC: 126 MMOL/L — LOW (ref 135–145)
SPECIMEN SOURCE: SIGNIFICANT CHANGE UP
SPECIMEN SOURCE: SIGNIFICANT CHANGE UP
WBC # BLD: 12.6 K/UL — HIGH (ref 3.8–10.5)
WBC # FLD AUTO: 12.6 K/UL — HIGH (ref 3.8–10.5)

## 2017-06-28 PROCEDURE — 71010: CPT | Mod: 26,77

## 2017-06-28 PROCEDURE — 71010: CPT | Mod: 26

## 2017-06-28 PROCEDURE — 99232 SBSQ HOSP IP/OBS MODERATE 35: CPT | Mod: GC

## 2017-06-28 RX ORDER — TAMSULOSIN HYDROCHLORIDE 0.4 MG/1
0.4 CAPSULE ORAL ONCE
Qty: 0 | Refills: 0 | Status: COMPLETED | OUTPATIENT
Start: 2017-06-28 | End: 2017-06-28

## 2017-06-28 RX ORDER — TAMSULOSIN HYDROCHLORIDE 0.4 MG/1
0.4 CAPSULE ORAL AT BEDTIME
Qty: 0 | Refills: 0 | Status: DISCONTINUED | OUTPATIENT
Start: 2017-06-28 | End: 2017-07-06

## 2017-06-28 RX ADMIN — OXYCODONE HYDROCHLORIDE 10 MILLIGRAM(S): 5 TABLET ORAL at 21:38

## 2017-06-28 RX ADMIN — SIMETHICONE 80 MILLIGRAM(S): 80 TABLET, CHEWABLE ORAL at 19:35

## 2017-06-28 RX ADMIN — OXYCODONE HYDROCHLORIDE 10 MILLIGRAM(S): 5 TABLET ORAL at 00:15

## 2017-06-28 RX ADMIN — LIDOCAINE 1 PATCH: 4 CREAM TOPICAL at 11:26

## 2017-06-28 RX ADMIN — Medication 1: at 18:22

## 2017-06-28 RX ADMIN — Medication 1: at 07:36

## 2017-06-28 RX ADMIN — TAMSULOSIN HYDROCHLORIDE 0.4 MILLIGRAM(S): 0.4 CAPSULE ORAL at 21:38

## 2017-06-28 RX ADMIN — OXYCODONE HYDROCHLORIDE 10 MILLIGRAM(S): 5 TABLET ORAL at 14:30

## 2017-06-28 RX ADMIN — Medication 30 MILLILITER(S): at 00:51

## 2017-06-28 RX ADMIN — Medication 10 MILLIGRAM(S): at 21:39

## 2017-06-28 RX ADMIN — OXYCODONE HYDROCHLORIDE 10 MILLIGRAM(S): 5 TABLET ORAL at 04:53

## 2017-06-28 RX ADMIN — Medication 30 MILLILITER(S): at 23:27

## 2017-06-28 RX ADMIN — Medication 100 MILLIGRAM(S): at 05:59

## 2017-06-28 RX ADMIN — Medication 10 MILLIGRAM(S): at 03:50

## 2017-06-28 RX ADMIN — PIPERACILLIN AND TAZOBACTAM 200 GRAM(S): 4; .5 INJECTION, POWDER, LYOPHILIZED, FOR SOLUTION INTRAVENOUS at 05:59

## 2017-06-28 RX ADMIN — PIPERACILLIN AND TAZOBACTAM 200 GRAM(S): 4; .5 INJECTION, POWDER, LYOPHILIZED, FOR SOLUTION INTRAVENOUS at 12:48

## 2017-06-28 RX ADMIN — FAMOTIDINE 20 MILLIGRAM(S): 10 INJECTION INTRAVENOUS at 05:59

## 2017-06-28 RX ADMIN — Medication 650 MILLIGRAM(S): at 19:33

## 2017-06-28 RX ADMIN — LIDOCAINE 1 PATCH: 4 CREAM TOPICAL at 00:01

## 2017-06-28 RX ADMIN — Medication 1: at 21:38

## 2017-06-28 RX ADMIN — OXYCODONE HYDROCHLORIDE 10 MILLIGRAM(S): 5 TABLET ORAL at 03:51

## 2017-06-28 RX ADMIN — Medication 300 MILLIGRAM(S): at 00:53

## 2017-06-28 RX ADMIN — TAMSULOSIN HYDROCHLORIDE 0.4 MILLIGRAM(S): 0.4 CAPSULE ORAL at 11:26

## 2017-06-28 RX ADMIN — PIPERACILLIN AND TAZOBACTAM 200 GRAM(S): 4; .5 INJECTION, POWDER, LYOPHILIZED, FOR SOLUTION INTRAVENOUS at 18:21

## 2017-06-28 RX ADMIN — OXYCODONE HYDROCHLORIDE 10 MILLIGRAM(S): 5 TABLET ORAL at 15:10

## 2017-06-28 RX ADMIN — Medication 300 MILLIGRAM(S): at 16:00

## 2017-06-28 RX ADMIN — Medication 1 TABLET(S): at 11:26

## 2017-06-28 RX ADMIN — Medication 650 MILLIGRAM(S): at 20:33

## 2017-06-28 RX ADMIN — FAMOTIDINE 20 MILLIGRAM(S): 10 INJECTION INTRAVENOUS at 18:21

## 2017-06-28 NOTE — PROGRESS NOTE ADULT - ASSESSMENT
62y old Male with a history of Anemia, Type 2 diabetes mellitus, TKR, BPH, HTN, s/p T10 to sacrum posterior decompression, T10 to sacrum posterior segmental instrumentation, T10 to sacrum posterior spinal fusion, pelvic fixation, and Neha osteotomy at L3-L4, L4-L5, and L5-[id number] with worsening back pain, now s/p revision s/p T10-pelvis PSF 62y old Male with a history of Anemia, Type 2 diabetes mellitus, TKR, BPH, HTN, s/p T10 to sacrum posterior decompression, T10 to sacrum posterior segmental instrumentation, T10 to sacrum posterior spinal fusion, pelvic fixation, and Neha osteotomy at L3-L4, L4-L5, and L5-[id number] with worsening back pain, now s/p revision s/p T10-pelvis PSF, not spiking fever x 48h

## 2017-06-28 NOTE — PROGRESS NOTE ADULT - SUBJECTIVE AND OBJECTIVE BOX
POST OPERATIVE DAY #: 8  STATUS POST: Revision L3-S1 osteotomy                       SUBJECTIVE: Patient seen and examined.     Pain:  well controlled      OBJECTIVE:     Vital Signs Last 24 Hrs  T(C): 37.2 (28 Jun 2017 14:42), Max: 37.2 (28 Jun 2017 14:42)  T(F): 98.9 (28 Jun 2017 14:42), Max: 98.9 (28 Jun 2017 14:42)  HR: 85 (28 Jun 2017 14:42) (85 - 103)  BP: 128/72 (28 Jun 2017 14:42) (108/76 - 128/78)  BP(mean): --  RR: 18 (28 Jun 2017 14:42) (16 - 18)  SpO2: 98% (28 Jun 2017 14:42) (97% - 98%)    PE:         Dressing: clean/dry/intact  b/l LE:          Sensation: intact to light touch          warm, well-perfused; capillary refill < 3 seconds              I&O's Detail    27 Jun 2017 07:01  -  28 Jun 2017 07:00  --------------------------------------------------------  IN:    IV PiggyBack: 1300 mL    Oral Fluid: 440 mL  Total IN: 1740 mL    OUT:    Indwelling Catheter - Urethral: 1600 mL    Voided: 650 mL  Total OUT: 2250 mL    Total NET: -510 mL      28 Jun 2017 07:01  -  28 Jun 2017 15:14  --------------------------------------------------------  IN:    IV PiggyBack: 100 mL    Oral Fluid: 360 mL  Total IN: 460 mL    OUT:    Voided: 300 mL  Total OUT: 300 mL    Total NET: 160 mL          LABS:                        11.2   12.6  )-----------( 359      ( 28 Jun 2017 07:28 )             31.8     06-28    126<L>  |  91<L>  |  13  ----------------------------<  222<H>  4.2   |  19<L>  |  0.90    Ca    8.7      28 Jun 2017 07:28            MEDICATIONS:  piperacillin/tazobactam IVPB. 3.375 Gram(s) IV Intermittent every 6 hours  vancomycin  IVPB 1500 milliGRAM(s) IV Intermittent every 12 hours    ondansetron Injectable 4 milliGRAM(s) IV Push every 6 hours PRN  acetaminophen   Tablet 650 milliGRAM(s) Oral every 6 hours PRN  acetaminophen   Tablet. 650 milliGRAM(s) Oral every 6 hours PRN  oxyCODONE IR 5 milliGRAM(s) Oral every 4 hours PRN  oxyCODONE IR 10 milliGRAM(s) Oral every 4 hours PRN  HYDROmorphone  Injectable 0.5 milliGRAM(s) IV Push every 2 hours PRN  zaleplon 5 milliGRAM(s) Oral at bedtime PRN  acetaminophen 325 mG/butalbital 50 mG/caffeine 40 mG 1 Tablet(s) Oral every 6 hours PRN  metoclopramide Injectable 10 milliGRAM(s) IV Push every 8 hours PRN  cyclobenzaprine 5 milliGRAM(s) Oral every 8 hours PRN        RADIOLOGY & ADDITIONAL STUDIES:    ASSESSMENT AND PLAN:     A/P :  62y Male s/p Rev T10-Pelvis PSF 6/20/17        -    Pain control + bowel regimen  -    DVT ppx: SCDs    -    Periop abx    -    ADAT  -    Check AM labs  -    Weight bearing status:  WBAT, OOB with PT today    -    Physical Therapy  -    Resume home meds  -    Dispo planning

## 2017-06-28 NOTE — PROGRESS NOTE ADULT - SUBJECTIVE AND OBJECTIVE BOX
Patient seen and examined at bedside.     SUBJECTIVE: No acute events overnight.  Pain tolerable.    Denies Chest Pain/SOB.    Denies Headache.    Denies Nausea/vomiting.      OBJECTIVE:   Vital Signs Last 24 Hrs  T(C): 36.6 (28 Jun 2017 04:26), Max: 37.1 (27 Jun 2017 14:42)  T(F): 97.8 (28 Jun 2017 04:26), Max: 98.8 (27 Jun 2017 14:42)  HR: 103 (28 Jun 2017 04:26) (87 - 103)  BP: 128/78 (28 Jun 2017 04:26) (108/76 - 128/78)  BP(mean): --  RR: 16 (28 Jun 2017 04:26) (16 - 17)  SpO2: 98% (28 Jun 2017 04:26) (97% - 98%)        NAD, non labored respirations  Affected extremity:          Dressing: clean/dry/intact          Drains: none         Sensation: [x ] intact to light touch  [ ] decreased                              Vascular: [x ] warm well perfused; capillary refill <3 seconds          Motor exam: [x ]         [ ] Upper extremity                   Earnest (c5)   Bi(c5/6)   WE(c6)   EE(c7)    (c8)                                                R           5              5            5             5             5                                               L            5              5            5             5            5         [x ] Lower extremity                   IP(L2)     Q(L3)     TA(L4)     EHL(L5)     GS(s1)                                                 R          5           5          5            3              3                                               L           5           5         5             2              2                                            11.2   12.6  )-----------( 359      ( 28 Jun 2017 07:28 )             31.8       I&O's Detail    27 Jun 2017 07:01  -  28 Jun 2017 07:00  --------------------------------------------------------  IN:    IV PiggyBack: 1300 mL    Oral Fluid: 440 mL  Total IN: 1740 mL    OUT:    Indwelling Catheter - Urethral: 1600 mL    Voided: 650 mL  Total OUT: 2250 mL    Total NET: -510 mL      28 Jun 2017 07:01  -  28 Jun 2017 09:36  --------------------------------------------------------  IN:    IV PiggyBack: 100 mL    Oral Fluid: 240 mL  Total IN: 340 mL    OUT:    Voided: 300 mL  Total OUT: 300 mL    Total NET: 40 mL                A/P :  62y Male s/p Rev T10-Pelvis PSF 6/20/17      -    Pain control + bowel regimen  -    DVT ppx: SCDs    -    Periop abx    -    ADAT  -    Check AM labs  -    Weight bearing status:   WBAT        -    Physical Therapy  -    Resume home meds  -    Dispo planning

## 2017-06-28 NOTE — PROGRESS NOTE ADULT - PROBLEM SELECTOR PLAN 1
-Patient continue to have recurrent fevers post operatively. No clear source.  -Given repeat fevers with no clear source, please repeat CT Lumbar and Thoracic spine to assess for occult fluid collection or abscess.  -Send repeat Blood culture, urine culture, Chest x-ray when patient is febrile.  -Remove Ratliff if possible  -C/w Zosyn 3.375gr IV q 6h (last day 6/30), Vanc 1.5g q12h (last day 6/30)  -Plan was discussed with the patient.  -Case discussed with Dr Pardo. -Patient continue to have recurrent fevers post operatively. No clear source.  -Given repeat fevers with no clear source,  per Ortho surgery to early to repeat CT Lumbar and Thoracic spine to assess for occult fluid collection or abscess.  -Send repeat Blood culture, urine culture: negative, Chest x-ray :clear.    -C/w Zosyn 3.375gr IV q 6h (last day 6/30), Vanc 1.5g q12h (last day 6/30)    -Case discussed with Dr Pardo.

## 2017-06-28 NOTE — PROGRESS NOTE ADULT - SUBJECTIVE AND OBJECTIVE BOX
Interval Events: reviewed  Patient seen and examined at bedside.    Patient is a 62y old  Male who presents with a chief complaint of Back pain, RLE spasms, LLE pain for 3 weeks, getting worse. (20 Jun 2017 13:05)  he is complaining of loss of urinary control      PAST MEDICAL & SURGICAL HISTORY:  Benign prostatic hypertrophy without lower urinary tract symptoms: BPH (benign prostatic hyperplasia)  Type 2 diabetes mellitus: Diabetes mellitus type 2 in obese  Essential hypertension: Hypertension  History of total knee replacement: Total knee replacement status, right      MEDICATIONS:  Pulmonary:    Antimicrobials:  piperacillin/tazobactam IVPB. 3.375 Gram(s) IV Intermittent every 6 hours  vancomycin  IVPB 1500 milliGRAM(s) IV Intermittent every 12 hours    Anticoagulants:    Cardiac:  tamsulosin 0.4 milliGRAM(s) Oral at bedtime      Allergies    No Known Allergies    Intolerances        Vital Signs Last 24 Hrs  T(C): 37.6 (28 Jun 2017 21:32), Max: 37.6 (28 Jun 2017 21:32)  T(F): 99.7 (28 Jun 2017 21:32), Max: 99.7 (28 Jun 2017 21:32)  HR: 100 (28 Jun 2017 21:32) (85 - 111)  BP: 109/74 (28 Jun 2017 21:32) (103/68 - 128/78)  BP(mean): --  RR: 17 (28 Jun 2017 21:32) (16 - 18)  SpO2: 99% (28 Jun 2017 21:32) (97% - 100%)    06-27 @ 07:01 - 06-28 @ 07:00  --------------------------------------------------------  IN: 1740 mL / OUT: 2250 mL / NET: -510 mL    06-28 @ 07:01 - 06-28 @ 21:48  --------------------------------------------------------  IN: 460 mL / OUT: 375 mL / NET: 85 mL          LABS:      CBC Full  -  ( 28 Jun 2017 07:28 )  WBC Count : 12.6 K/uL  Hemoglobin : 11.2 g/dL  Hematocrit : 31.8 %  Platelet Count - Automated : 359 K/uL  Mean Cell Volume : 81.3 fL  Mean Cell Hemoglobin : 28.6 pg  Mean Cell Hemoglobin Concentration : 35.2 g/dL  Auto Neutrophil # : x  Auto Lymphocyte # : x  Auto Monocyte # : x  Auto Eosinophil # : x  Auto Basophil # : x  Auto Neutrophil % : x  Auto Lymphocyte % : x  Auto Monocyte % : x  Auto Eosinophil % : x  Auto Basophil % : x    06-28    126<L>  |  91<L>  |  13  ----------------------------<  222<H>  4.2   |  19<L>  |  0.90    Ca    8.7      28 Jun 2017 07:28        < from: Xray Chest 1 View AP- PORTABLE-Urgent (06.28.17 @ 14:25) >    EXAM:  XR CHEST 1 VIEW PORT URGENT                          PROCEDURE DATE:  06/28/2017                     INTERPRETATION:  Portable chest    History: Postoperative exam. Shortness of breath    Hypoinflation. Lungs clear. No infiltrate or pleural effusion. Similar to   prior exam earlier same day.    Impressions:    Lungs clear.    < end of copied text >                    RADIOLOGY & ADDITIONAL STUDIES (The following images were personally reviewed):  R[place]iff:                                No  Urine output:               Yes          DVT prophylaxis:         Yes         Flattus:                          Yes  Bowel movement:      No

## 2017-06-28 NOTE — PROGRESS NOTE ADULT - SUBJECTIVE AND OBJECTIVE BOX
Pt. seen at 1036  Pain Management Progress Note - [facility] [phone]    HPI:  Pt. states the pain medication is helpful.  No new complaints.              Pertinent PMH: Pain at: _x__Back ___Neck___Knee ___Hip ___Shoulder ___ Opioid tolerance    Pain is __x_ sharp ____dull ___burning ___achy ___ Intensity: ____ mild ____mod ____severe     Location __x___surgical site _____cervical __x___lumbar ____abd _____upper ext____lower ext    Worse with __x__activity ___x_movement _____physical therapy___ Rest    Improved with __x__medication _x___rest ____physical therapy    ketorolac   Injectable  diazepam  Injectable  oxyCODONE  5 mG/acetaminophen 325 mG  oxyCODONE  5 mG/acetaminophen 325 mG  lactated ringers.  acetaminophen   Tablet  acetaminophen   Tablet.  oxyCODONE IR  oxyCODONE IR  morphine  - Injectable  magnesium hydroxide Suspension  bisacodyl Suppository  docusate sodium  lactated ringers.  HYDROmorphone  Injectable  ceFAZolin   IVPB  aluminum hydroxide/magnesium hydroxide/simethicone Suspension  famotidine    Tablet  ondansetron Injectable  docusate sodium  senna  multivitamin  HYDROmorphone PCA (1 mG/mL)  HYDROmorphone PCA (1 mG/mL) Rescue Clinician Bolus  naloxone Injectable  acetaminophen  IVPB.  (ADM OVERRIDE)  lactated ringers Bolus  sodium chloride 0.9% Bolus  diazepam    Tablet  lidocaine   Patch  acetaminophen   Tablet  acetaminophen   Tablet.  lidocaine   Patch  oxyCODONE IR  oxyCODONE IR  HYDROmorphone  Injectable  trimethoprim  160 mG/sulfamethoxazole 800 mG  insulin lispro (HumaLOG) corrective regimen sliding scale  dextrose 5%.  dextrose Gel  dextrose 50% Injectable  dextrose 50% Injectable  dextrose 50% Injectable  glucagon  Injectable  piperacillin/tazobactam IVPB.  piperacillin/tazobactam IVPB.  vancomycin  IVPB  vancomycin  IVPB  zaleplon  vancomycin  IVPB  vancomycin  IVPB  sodium chloride 0.65% Nasal  acetaminophen 325 mG/butalbital 50 mG/caffeine 40 mG  metoclopramide  simethicone  metoclopramide Injectable  metoclopramide Injectable  magnesium hydroxide Suspension  metoclopramide Injectable  cyclobenzaprine  bisacodyl Suppository  tamsulosin      ROS: Const:  ___febrile   Eyes:___ENT:___CV: ___chest pain  Resp: ____sob  GI:___nausea ___vomiting ____abd pain ___npo ___clears _+__full diet __bm  :___ Musk: _+__pain __+_spasm  Skin:___ Neuro:  ___sedation___confusion__-__ numbness ___weakness ___paresthesia  Psych:___anxiety  Endo:___ Heme:___Allergy:___      06-28 @ 07:2890 mL/min/1.73M2          Hemoglobin: 11.2 g/dL (06-28 @ 07:28)  Hemoglobin: 9.9 g/dL (06-27 @ 06:42)        T(C): 37.2 (06-28-17 @ 14:42), Max: 37.2 (06-28-17 @ 14:42)  HR: 85 (06-28-17 @ 14:42) (85 - 103)  BP: 128/72 (06-28-17 @ 14:42) (108/76 - 128/78)  RR: 18 (06-28-17 @ 14:42) (16 - 18)  SpO2: 98% (06-28-17 @ 14:42) (97% - 98%)  Wt(kg): --     PHYSICAL EXAM:  Gen Appearance: _x__no acute distress _x__appropriate         Neuro: _x__SILT feet__x__ EOM Intact Psych: AAOX3__, _x__mood/affect appropriate        Eyes: _x__conjunctiva WNL  ___x__ Pupils equal and round        ENT: _x__ears and nose atraumatic_x__ Hearing grossly intact        Neck: ___trachea midline, no visible masses ___thyroid without palpable mass    Resp: _x__Nml WOB____No tactile fremitus ___clear to auscultation    Cardio: ___extremities free from edema ____pedal pulses palpable    GI/Abdomen: _x__soft ___x__ Nontender______Nondistended_____HSM    Lymphatic: ___no palpable nodes in neck  ___no palpable nodes calves and feet    Skin/Wound: ___Incision, ___Dressing c/d/i,   ____surrounding tissues soft,  ___drain/chest tube present____    Muscular: EHL __5_/5  Gastrocnemius___/5    _x__absent clubbing/cyanosis         ASSESSMENT:  This is a 62y old Male with a history of:  ACUTE LEFT-SIDED LOW BACK PAIN WITH LEFT-SIDED  No h/o HF  Family history of diabetes mellitus (Sibling)  Benign prostatic hypertrophy without lower urinary tract symptoms  Type 2 diabetes mellitus  Essential hypertension  Acute left-sided low back pain with left-sided sciatica  Fever  Fever postop  Hypotension  Anemia due to blood loss  History of total knee replacement  and back pain S/P T10-pelvis PSF and is doing well.        Recommended Treatment PLAN:  1.  Oxycodone 5-10 mg po q4h prn  2.  Dilaudid 0.5 mg IV q2h prn  3.  Flexeril 5 mg po q8h prn  4.  Lidoderm patch

## 2017-06-28 NOTE — PROGRESS NOTE ADULT - SUBJECTIVE AND OBJECTIVE BOX
INTERVAL HPI/OVERNIGHT EVENTS:    OVERNIGHT: No overnight events.  SUBJECTIVE: Patient seen and examined at bedside.     ROS:  CV: Denies chest pain  Resp: Denies SOB  GI: Denies abdominal pain, constipation, diarrhea, nausea, vomiting  : Denies dysuria  ID: Denies fevers, chills  MSK: Denies joint pain     OBJECTIVE:    VITAL SIGNS:  ICU Vital Signs Last 24 Hrs  T(C): 37.2 (28 Jun 2017 14:42), Max: 37.2 (28 Jun 2017 14:42)  T(F): 98.9 (28 Jun 2017 14:42), Max: 98.9 (28 Jun 2017 14:42)  HR: 85 (28 Jun 2017 14:42) (85 - 107)  BP: 128/72 (28 Jun 2017 14:42) (108/76 - 128/78)  BP(mean): --  ABP: --  ABP(mean): --  RR: 18 (28 Jun 2017 14:42) (16 - 18)  SpO2: 98% (28 Jun 2017 14:42) (97% - 100%)        06-27 @ 07:01 - 06-28 @ 07:00  --------------------------------------------------------  IN: 1740 mL / OUT: 2250 mL / NET: -510 mL    06-28 @ 07:01  - 06-28 @ 17:08  --------------------------------------------------------  IN: 460 mL / OUT: 300 mL / NET: 160 mL      CAPILLARY BLOOD GLUCOSE  150 (28 Jun 2017 11:39)          PHYSICAL EXAM:    General: NAD, comfortable  HEENT: NCAT, PERRL, clear conjunctiva, no scleral icterus  Neck: supple, no JVD  Respiratory: CTA b/l, no wheezing, rhonchi, rales  Cardiovascular: RRR, normal S1S2, no M/R/G  Abdomen: soft, NT/ND, bowel sounds in all four quadrants, no palpable masses  Extremities: WWP, no clubbing, cyanosis, or edema  Neuro: AOx3    MEDICATIONS:  MEDICATIONS  (STANDING):  lactated ringers. 1000 milliLiter(s) (125 mL/Hr) IV Continuous <Continuous>  famotidine    Tablet 20 milliGRAM(s) Oral every 12 hours  docusate sodium 100 milliGRAM(s) Oral three times a day  senna 2 Tablet(s) Oral at bedtime  multivitamin 1 Tablet(s) Oral daily  lidocaine   Patch 1 Patch Transdermal daily  insulin lispro (HumaLOG) corrective regimen sliding scale   SubCutaneous Before meals and at bedtime  dextrose 5%. 1000 milliLiter(s) (50 mL/Hr) IV Continuous <Continuous>  dextrose 50% Injectable 12.5 Gram(s) IV Push once  dextrose 50% Injectable 25 Gram(s) IV Push once  dextrose 50% Injectable 25 Gram(s) IV Push once  piperacillin/tazobactam IVPB. 3.375 Gram(s) IV Intermittent every 6 hours  vancomycin  IVPB 1500 milliGRAM(s) IV Intermittent every 12 hours  tamsulosin 0.4 milliGRAM(s) Oral at bedtime    MEDICATIONS  (PRN):  aluminum hydroxide/magnesium hydroxide/simethicone Suspension 30 milliLiter(s) Oral every 12 hours PRN Indigestion  ondansetron Injectable 4 milliGRAM(s) IV Push every 6 hours PRN Nausea  naloxone Injectable 0.1 milliGRAM(s) IV Push every 3 minutes PRN For ANY of the following changes in patient status:  A. RR LESS THAN 10 breaths per minute, B. Oxygen saturation LESS THAN 90%, C. Sedation score of 6  acetaminophen   Tablet 650 milliGRAM(s) Oral every 6 hours PRN For Temp greater than 38 C (100.4 F)  acetaminophen   Tablet. 650 milliGRAM(s) Oral every 6 hours PRN Mild Pain (1 - 3)  oxyCODONE IR 5 milliGRAM(s) Oral every 4 hours PRN Moderate Pain (4 - 6)  oxyCODONE IR 10 milliGRAM(s) Oral every 4 hours PRN Severe Pain (7 - 10)  HYDROmorphone  Injectable 0.5 milliGRAM(s) IV Push every 2 hours PRN breakthrough pain  dextrose Gel 1 Dose(s) Oral once PRN Blood Glucose LESS THAN 70 milliGRAM(s)/deciliter  glucagon  Injectable 1 milliGRAM(s) IntraMuscular once PRN Glucose LESS THAN 70 milligrams/deciliter  zaleplon 5 milliGRAM(s) Oral at bedtime PRN Insomnia  sodium chloride 0.65% Nasal 1 Spray(s) Both Nostrils every 4 hours PRN Nasal Congestion  acetaminophen 325 mG/butalbital 50 mG/caffeine 40 mG 1 Tablet(s) Oral every 6 hours PRN Headache  simethicone 80 milliGRAM(s) Chew every 4 hours PRN hiccups  magnesium hydroxide Suspension 30 milliLiter(s) Oral daily PRN Constipation  metoclopramide Injectable 10 milliGRAM(s) IV Push every 8 hours PRN hiccups  cyclobenzaprine 5 milliGRAM(s) Oral every 8 hours PRN Muscle Spasm  bisacodyl Suppository 10 milliGRAM(s) Rectal daily PRN Constipation      ALLERGIES:  Allergies    No Known Allergies    Intolerances        LABS:                        11.2   12.6  )-----------( 359      ( 28 Jun 2017 07:28 )             31.8     06-28    126<L>  |  91<L>  |  13  ----------------------------<  222<H>  4.2   |  19<L>  |  0.90    Ca    8.7      28 Jun 2017 07:28            RADIOLOGY & ADDITIONAL TESTS: Studies reviewed. INTERVAL HPI/OVERNIGHT EVENTS:    OVERNIGHT: No overnight events.  SUBJECTIVE: Patient seen and examined at bedside.     ROS:  CV: Denies chest pain  Resp: Denies SOB  GI: Denies abdominal pain, constipation, diarrhea, nausea, vomiting  : Denies dysuria  ID: Denies fevers, chills  MSK: Denies joint pain     OBJECTIVE:    VITAL SIGNS:  ICU Vital Signs Last 24 Hrs  T(C): 37.2 (28 Jun 2017 14:42), Max: 37.2 (28 Jun 2017 14:42)  T(F): 98.9 (28 Jun 2017 14:42), Max: 98.9 (28 Jun 2017 14:42)  HR: 85 (28 Jun 2017 14:42) (85 - 107)  BP: 128/72 (28 Jun 2017 14:42) (108/76 - 128/78)  BP(mean): --  ABP: --  ABP(mean): --  RR: 18 (28 Jun 2017 14:42) (16 - 18)  SpO2: 98% (28 Jun 2017 14:42) (97% - 100%)        06-27 @ 07:01 - 06-28 @ 07:00  --------------------------------------------------------  IN: 1740 mL / OUT: 2250 mL / NET: -510 mL    06-28 @ 07:01  - 06-28 @ 17:08  --------------------------------------------------------  IN: 460 mL / OUT: 300 mL / NET: 160 mL      CAPILLARY BLOOD GLUCOSE  150 (28 Jun 2017 11:39)          PHYSICAL EXAM:    General: NAD, comfortable  HEENT: NCAT, PERRL, clear conjunctiva, no scleral icterus  Neck: supple, no JVD  Respiratory: CTA b/l, no wheezing, rhonchi, rales  Cardiovascular: RRR, normal S1S2, no M/R/G  Abdomen: soft, NT/ND, bowel sounds in all four quadrants, no palpable masses  Extremities: WWP, no clubbing, cyanosis, or edema  Neuro: AOx3    MEDICATIONS:  MEDICATIONS  (STANDING):  lactated ringers. 1000 milliLiter(s) (125 mL/Hr) IV Continuous <Continuous>  famotidine    Tablet 20 milliGRAM(s) Oral every 12 hours  docusate sodium 100 milliGRAM(s) Oral three times a day  senna 2 Tablet(s) Oral at bedtime  multivitamin 1 Tablet(s) Oral daily  lidocaine   Patch 1 Patch Transdermal daily  insulin lispro (HumaLOG) corrective regimen sliding scale   SubCutaneous Before meals and at bedtime  dextrose 5%. 1000 milliLiter(s) (50 mL/Hr) IV Continuous <Continuous>  dextrose 50% Injectable 12.5 Gram(s) IV Push once  dextrose 50% Injectable 25 Gram(s) IV Push once  dextrose 50% Injectable 25 Gram(s) IV Push once  piperacillin/tazobactam IVPB. 3.375 Gram(s) IV Intermittent every 6 hours  vancomycin  IVPB 1500 milliGRAM(s) IV Intermittent every 12 hours  tamsulosin 0.4 milliGRAM(s) Oral at bedtime    MEDICATIONS  (PRN):  aluminum hydroxide/magnesium hydroxide/simethicone Suspension 30 milliLiter(s) Oral every 12 hours PRN Indigestion  ondansetron Injectable 4 milliGRAM(s) IV Push every 6 hours PRN Nausea  naloxone Injectable 0.1 milliGRAM(s) IV Push every 3 minutes PRN For ANY of the following changes in patient status:  A. RR LESS THAN 10 breaths per minute, B. Oxygen saturation LESS THAN 90%, C. Sedation score of 6  acetaminophen   Tablet 650 milliGRAM(s) Oral every 6 hours PRN For Temp greater than 38 C (100.4 F)  acetaminophen   Tablet. 650 milliGRAM(s) Oral every 6 hours PRN Mild Pain (1 - 3)  oxyCODONE IR 5 milliGRAM(s) Oral every 4 hours PRN Moderate Pain (4 - 6)  oxyCODONE IR 10 milliGRAM(s) Oral every 4 hours PRN Severe Pain (7 - 10)  HYDROmorphone  Injectable 0.5 milliGRAM(s) IV Push every 2 hours PRN breakthrough pain  dextrose Gel 1 Dose(s) Oral once PRN Blood Glucose LESS THAN 70 milliGRAM(s)/deciliter  glucagon  Injectable 1 milliGRAM(s) IntraMuscular once PRN Glucose LESS THAN 70 milligrams/deciliter  zaleplon 5 milliGRAM(s) Oral at bedtime PRN Insomnia  sodium chloride 0.65% Nasal 1 Spray(s) Both Nostrils every 4 hours PRN Nasal Congestion  acetaminophen 325 mG/butalbital 50 mG/caffeine 40 mG 1 Tablet(s) Oral every 6 hours PRN Headache  simethicone 80 milliGRAM(s) Chew every 4 hours PRN hiccups  magnesium hydroxide Suspension 30 milliLiter(s) Oral daily PRN Constipation  metoclopramide Injectable 10 milliGRAM(s) IV Push every 8 hours PRN hiccups  cyclobenzaprine 5 milliGRAM(s) Oral every 8 hours PRN Muscle Spasm  bisacodyl Suppository 10 milliGRAM(s) Rectal daily PRN Constipation      ALLERGIES:  Allergies    No Known Allergies    Intolerances        LABS:                        11.2   12.6  )-----------( 359      ( 28 Jun 2017 07:28 )             31.8     06-28    126<L>  |  91<L>  |  13  ----------------------------<  222<H>  4.2   |  19<L>  |  0.90    Ca    8.7      28 Jun 2017 07:28            RADIOLOGY & ADDITIONAL TESTS: Studies reviewed.  Chest x-ray: clear

## 2017-06-29 LAB
ANION GAP SERPL CALC-SCNC: 15 MMOL/L — SIGNIFICANT CHANGE UP (ref 5–17)
BUN SERPL-MCNC: 16 MG/DL — SIGNIFICANT CHANGE UP (ref 7–23)
CALCIUM SERPL-MCNC: 8.4 MG/DL — SIGNIFICANT CHANGE UP (ref 8.4–10.5)
CHLORIDE SERPL-SCNC: 92 MMOL/L — LOW (ref 96–108)
CO2 SERPL-SCNC: 21 MMOL/L — LOW (ref 22–31)
CREAT SERPL-MCNC: 0.9 MG/DL — SIGNIFICANT CHANGE UP (ref 0.5–1.3)
CRP SERPL-MCNC: 12.5 MG/DL — HIGH (ref 0–0.4)
ERYTHROCYTE [SEDIMENTATION RATE] IN BLOOD: 110 MM/HR — HIGH
GLUCOSE SERPL-MCNC: 183 MG/DL — HIGH (ref 70–99)
HCT VFR BLD CALC: 30.1 % — LOW (ref 39–50)
HGB BLD-MCNC: 10.5 G/DL — LOW (ref 13–17)
MCHC RBC-ENTMCNC: 28.4 PG — SIGNIFICANT CHANGE UP (ref 27–34)
MCHC RBC-ENTMCNC: 34.9 G/DL — SIGNIFICANT CHANGE UP (ref 32–36)
MCV RBC AUTO: 81.4 FL — SIGNIFICANT CHANGE UP (ref 80–100)
PLATELET # BLD AUTO: 422 K/UL — HIGH (ref 150–400)
POTASSIUM SERPL-MCNC: 4.2 MMOL/L — SIGNIFICANT CHANGE UP (ref 3.5–5.3)
POTASSIUM SERPL-SCNC: 4.2 MMOL/L — SIGNIFICANT CHANGE UP (ref 3.5–5.3)
RBC # BLD: 3.7 M/UL — LOW (ref 4.2–5.8)
RBC # FLD: 14.1 % — SIGNIFICANT CHANGE UP (ref 10.3–16.9)
SODIUM SERPL-SCNC: 128 MMOL/L — LOW (ref 135–145)
VANCOMYCIN TROUGH SERPL-MCNC: 18.6 UG/ML — SIGNIFICANT CHANGE UP (ref 10–20)
WBC # BLD: 12.2 K/UL — HIGH (ref 3.8–10.5)
WBC # FLD AUTO: 12.2 K/UL — HIGH (ref 3.8–10.5)

## 2017-06-29 PROCEDURE — 99232 SBSQ HOSP IP/OBS MODERATE 35: CPT | Mod: GC

## 2017-06-29 PROCEDURE — 99233 SBSQ HOSP IP/OBS HIGH 50: CPT | Mod: GC

## 2017-06-29 RX ORDER — PIPERACILLIN AND TAZOBACTAM 4; .5 G/20ML; G/20ML
3.38 INJECTION, POWDER, LYOPHILIZED, FOR SOLUTION INTRAVENOUS ONCE
Qty: 0 | Refills: 0 | Status: COMPLETED | OUTPATIENT
Start: 2017-06-29 | End: 2017-06-29

## 2017-06-29 RX ORDER — VANCOMYCIN HCL 1 G
1500 VIAL (EA) INTRAVENOUS ONCE
Qty: 0 | Refills: 0 | Status: COMPLETED | OUTPATIENT
Start: 2017-06-29 | End: 2017-06-29

## 2017-06-29 RX ADMIN — Medication 1 TABLET(S): at 12:34

## 2017-06-29 RX ADMIN — HYDROMORPHONE HYDROCHLORIDE 0.5 MILLIGRAM(S): 2 INJECTION INTRAMUSCULAR; INTRAVENOUS; SUBCUTANEOUS at 17:26

## 2017-06-29 RX ADMIN — Medication 100 MILLIGRAM(S): at 21:43

## 2017-06-29 RX ADMIN — Medication 650 MILLIGRAM(S): at 22:11

## 2017-06-29 RX ADMIN — TAMSULOSIN HYDROCHLORIDE 0.4 MILLIGRAM(S): 0.4 CAPSULE ORAL at 21:43

## 2017-06-29 RX ADMIN — FAMOTIDINE 20 MILLIGRAM(S): 10 INJECTION INTRAVENOUS at 05:52

## 2017-06-29 RX ADMIN — PIPERACILLIN AND TAZOBACTAM 200 GRAM(S): 4; .5 INJECTION, POWDER, LYOPHILIZED, FOR SOLUTION INTRAVENOUS at 05:51

## 2017-06-29 RX ADMIN — OXYCODONE HYDROCHLORIDE 10 MILLIGRAM(S): 5 TABLET ORAL at 23:12

## 2017-06-29 RX ADMIN — HYDROMORPHONE HYDROCHLORIDE 0.5 MILLIGRAM(S): 2 INJECTION INTRAMUSCULAR; INTRAVENOUS; SUBCUTANEOUS at 17:06

## 2017-06-29 RX ADMIN — OXYCODONE HYDROCHLORIDE 10 MILLIGRAM(S): 5 TABLET ORAL at 15:30

## 2017-06-29 RX ADMIN — Medication 1: at 08:41

## 2017-06-29 RX ADMIN — Medication 650 MILLIGRAM(S): at 21:11

## 2017-06-29 RX ADMIN — Medication 300 MILLIGRAM(S): at 16:19

## 2017-06-29 RX ADMIN — CYCLOBENZAPRINE HYDROCHLORIDE 5 MILLIGRAM(S): 10 TABLET, FILM COATED ORAL at 23:07

## 2017-06-29 RX ADMIN — Medication 650 MILLIGRAM(S): at 08:44

## 2017-06-29 RX ADMIN — OXYCODONE HYDROCHLORIDE 10 MILLIGRAM(S): 5 TABLET ORAL at 06:50

## 2017-06-29 RX ADMIN — Medication: at 12:36

## 2017-06-29 RX ADMIN — Medication 650 MILLIGRAM(S): at 09:44

## 2017-06-29 RX ADMIN — OXYCODONE HYDROCHLORIDE 10 MILLIGRAM(S): 5 TABLET ORAL at 02:05

## 2017-06-29 RX ADMIN — FAMOTIDINE 20 MILLIGRAM(S): 10 INJECTION INTRAVENOUS at 16:19

## 2017-06-29 RX ADMIN — Medication 300 MILLIGRAM(S): at 04:35

## 2017-06-29 RX ADMIN — Medication 100 MILLIGRAM(S): at 05:52

## 2017-06-29 RX ADMIN — PIPERACILLIN AND TAZOBACTAM 200 GRAM(S): 4; .5 INJECTION, POWDER, LYOPHILIZED, FOR SOLUTION INTRAVENOUS at 12:34

## 2017-06-29 RX ADMIN — HYDROMORPHONE HYDROCHLORIDE 0.5 MILLIGRAM(S): 2 INJECTION INTRAMUSCULAR; INTRAVENOUS; SUBCUTANEOUS at 12:45

## 2017-06-29 RX ADMIN — SIMETHICONE 80 MILLIGRAM(S): 80 TABLET, CHEWABLE ORAL at 01:35

## 2017-06-29 RX ADMIN — OXYCODONE HYDROCHLORIDE 10 MILLIGRAM(S): 5 TABLET ORAL at 07:30

## 2017-06-29 RX ADMIN — HYDROMORPHONE HYDROCHLORIDE 0.5 MILLIGRAM(S): 2 INJECTION INTRAMUSCULAR; INTRAVENOUS; SUBCUTANEOUS at 12:34

## 2017-06-29 RX ADMIN — OXYCODONE HYDROCHLORIDE 10 MILLIGRAM(S): 5 TABLET ORAL at 15:44

## 2017-06-29 RX ADMIN — OXYCODONE HYDROCHLORIDE 10 MILLIGRAM(S): 5 TABLET ORAL at 01:35

## 2017-06-29 RX ADMIN — PIPERACILLIN AND TAZOBACTAM 200 GRAM(S): 4; .5 INJECTION, POWDER, LYOPHILIZED, FOR SOLUTION INTRAVENOUS at 01:36

## 2017-06-29 RX ADMIN — SIMETHICONE 80 MILLIGRAM(S): 80 TABLET, CHEWABLE ORAL at 17:06

## 2017-06-29 NOTE — PROGRESS NOTE ADULT - ASSESSMENT
62y old Male with a history of Anemia, Type 2 diabetes mellitus, TKR, BPH, HTN, s/p T10 to sacrum posterior decompression, T10 to sacrum posterior segmental instrumentation, T10 to sacrum posterior spinal fusion, pelvic fixation, and Neha osteotomy at L3-L4, L4-L5, and [id number] with worsening back pain, now s/p revision s/p T10-pelvis PSF, no longer febrile.

## 2017-06-29 NOTE — PROGRESS NOTE ADULT - ATTENDING COMMENTS
I have reviewed the medical record, including laboratory and radiographic studies, examined the patient and discussed the plan with Dr. Diggs, the ID Resident.  Agree with above.  Please recall if further ID input is desired.
stool was sent for C. difficile analysis.

## 2017-06-29 NOTE — PROGRESS NOTE ADULT - SUBJECTIVE AND OBJECTIVE BOX
ORTHO NOTE    [x ] Pt seen/examined.  [x ] Pt without any complaints/in NAD.    [ ] Pt complains of:      ROS: [ ] Fever  [ ] Chills  [ ] CP [ ] SOB [ ] Dysnea  [ ] Palpitations [ ] Cough [ ] N/V/C/D [ ] Paresthia [ ] Other     [x ] ROS  otherwise negative    .    PHYSICAL EXAM:    Vital Signs Last 24 Hrs  T(C): 37.7 (29 Jun 2017 08:54), Max: 37.8 (29 Jun 2017 06:15)  T(F): 99.8 (29 Jun 2017 08:54), Max: 100 (29 Jun 2017 06:15)  HR: 101 (29 Jun 2017 08:54) (85 - 111)  BP: 105/73 (29 Jun 2017 08:54) (103/68 - 128/81)  BP(mean): --  RR: 16 (29 Jun 2017 08:54) (16 - 18)  SpO2: 96% (29 Jun 2017 08:54) (96% - 99%)    I&O's Detail    28 Jun 2017 07:01  -  29 Jun 2017 07:00  --------------------------------------------------------  IN:    IV PiggyBack: 100 mL    Oral Fluid: 360 mL  Total IN: 460 mL    OUT:    Voided: 375 mL  Total OUT: 375 mL    Total NET: 85 mL           CAPILLARY BLOOD GLUCOSE  214 (29 Jun 2017 11:36)  183 (28 Jun 2017 21:17)  181 (28 Jun 2017 16:48)                      Neuro: AAOX3    Lungs: CTA, IS demonstrated    CV:    ABD: soft, nontender, no diarrhea/BM overnight    Ext: bilateral LE NVID, strength 5/5    LABS                        11.2   12.6  )-----------( 359      ( 28 Jun 2017 07:28 )             31.8                                06-28    126<L>  |  91<L>  |  13  ----------------------------<  222<H>  4.2   |  19<L>  |  0.90    Ca    8.7      28 Jun 2017 07:28        [ ] Other Labs  [ ] None ordered            Please check or [place] when present:  •  Heart Failure:    [ ] Acute        [ ]  Acute on Chronic        [ ] Chronic         [ ] Diastolic     [ ]  Combined    •  ASIM:     [ ] ATN        [ ]  Renal medullary necrosis       [ ]  Renal cortical necrosis                  [ ] Other pathological Lesion:  •  CKD:  [ ] Stage I   [ ] Stage II  [ ] Stage III    [ ]Stage IV   [ ]  CKD V   [ ]  Other/Unspecified:    •  Abdominal Nutritional Status:   [ ] Malnutrition-See Nutrition note    [ ] Cachexia   [ ]  Other        [ ] Supplement ordered:            [ ] Morbid Obesity: BMI >=40         ASSESSMENT/PLAN:      STATUS POST: [id number] PSF  Dr. Pardo ID recommends to stop antibiotics in the afternoon. AFVSS and fever workup negative  Incontinent of bladder and physical therapy reports he cannot feel sensation when needs to have bowel movement.  Dr. King aware and does not think neurology c/s needed  patient able to sit up in bed independently  CONTINUE:          [ ] PT- WBAT     [ ] DVT PPX- scd boots    [ ] Pain Mgt- po meds    [ ] Dispo plan- patient does not have insurance.  Patient and family will not pay for [facility].  Patient needs to progress with physical therapy to go home and SW contacted to see if [facility] can be set up

## 2017-06-29 NOTE — PROGRESS NOTE ADULT - SUBJECTIVE AND OBJECTIVE BOX
INTERVAL HPI/OVERNIGHT EVENTS:    OVERNIGHT: No overnight events.  SUBJECTIVE: Patient seen and examined at bedside. Patient is comfortable, with no complaints at this time.    ROS:  CV: Denies chest pain  Resp: Denies SOB  GI: Denies abdominal pain, constipation, diarrhea, nausea, vomiting  : Denies dysuria  ID: Denies fevers, chills  MSK: Denies joint pain     OBJECTIVE:    VITAL SIGNS:  ICU Vital Signs Last 24 Hrs  T(C): 37.3 (29 Jun 2017 16:37), Max: 37.8 (29 Jun 2017 06:15)  T(F): 99.2 (29 Jun 2017 16:37), Max: 100 (29 Jun 2017 06:15)  HR: 88 (29 Jun 2017 16:37) (86 - 101)  BP: 130/87 (29 Jun 2017 16:37) (105/73 - 130/87)  BP(mean): --  ABP: --  ABP(mean): --  RR: 17 (29 Jun 2017 16:37) (16 - 18)  SpO2: 98% (29 Jun 2017 16:37) (96% - 99%)        06-28 @ 07:01 - 06-29 @ 07:00  --------------------------------------------------------  IN: 460 mL / OUT: 375 mL / NET: 85 mL    06-29 @ 07:01 - 06-29 @ 20:14  --------------------------------------------------------  IN: 1080 mL / OUT: 800 mL / NET: 280 mL      CAPILLARY BLOOD GLUCOSE  134 (29 Jun 2017 16:19)          PHYSICAL EXAM:    General: NAD, comfortable, pleasant, conversational  HEENT: NCAT, PERRL, clear conjunctiva, no scleral icterus  Neck: supple, no JVD  Respiratory: CTA b/l, no wheezing, rhonchi, rales  Cardiovascular: RRR, normal S1S2, no M/R/G  Abdomen: soft, NT/ND, bowel sounds in all four quadrants, no palpable masses  Extremities: WWP, no clubbing, cyanosis, or edema  Neuro: AOx3    MEDICATIONS:  MEDICATIONS  (STANDING):  famotidine    Tablet 20 milliGRAM(s) Oral every 12 hours  docusate sodium 100 milliGRAM(s) Oral three times a day  senna 2 Tablet(s) Oral at bedtime  multivitamin 1 Tablet(s) Oral daily  lidocaine   Patch 1 Patch Transdermal daily  insulin lispro (HumaLOG) corrective regimen sliding scale   SubCutaneous Before meals and at bedtime  dextrose 5%. 1000 milliLiter(s) (50 mL/Hr) IV Continuous <Continuous>  dextrose 50% Injectable 12.5 Gram(s) IV Push once  dextrose 50% Injectable 25 Gram(s) IV Push once  dextrose 50% Injectable 25 Gram(s) IV Push once  tamsulosin 0.4 milliGRAM(s) Oral at bedtime    MEDICATIONS  (PRN):  aluminum hydroxide/magnesium hydroxide/simethicone Suspension 30 milliLiter(s) Oral every 12 hours PRN Indigestion  ondansetron Injectable 4 milliGRAM(s) IV Push every 6 hours PRN Nausea  naloxone Injectable 0.1 milliGRAM(s) IV Push every 3 minutes PRN For ANY of the following changes in patient status:  A. RR LESS THAN 10 breaths per minute, B. Oxygen saturation LESS THAN 90%, C. Sedation score of 6  acetaminophen   Tablet 650 milliGRAM(s) Oral every 6 hours PRN For Temp greater than 38 C (100.4 F)  acetaminophen   Tablet. 650 milliGRAM(s) Oral every 6 hours PRN Mild Pain (1 - 3)  oxyCODONE IR 5 milliGRAM(s) Oral every 4 hours PRN Moderate Pain (4 - 6)  oxyCODONE IR 10 milliGRAM(s) Oral every 4 hours PRN Severe Pain (7 - 10)  HYDROmorphone  Injectable 0.5 milliGRAM(s) IV Push every 2 hours PRN breakthrough pain  dextrose Gel 1 Dose(s) Oral once PRN Blood Glucose LESS THAN 70 milliGRAM(s)/deciliter  glucagon  Injectable 1 milliGRAM(s) IntraMuscular once PRN Glucose LESS THAN 70 milligrams/deciliter  zaleplon 5 milliGRAM(s) Oral at bedtime PRN Insomnia  sodium chloride 0.65% Nasal 1 Spray(s) Both Nostrils every 4 hours PRN Nasal Congestion  acetaminophen 325 mG/butalbital 50 mG/caffeine 40 mG 1 Tablet(s) Oral every 6 hours PRN Headache  simethicone 80 milliGRAM(s) Chew every 4 hours PRN hiccups  magnesium hydroxide Suspension 30 milliLiter(s) Oral daily PRN Constipation  metoclopramide Injectable 10 milliGRAM(s) IV Push every 8 hours PRN hiccups  cyclobenzaprine 5 milliGRAM(s) Oral every 8 hours PRN Muscle Spasm  bisacodyl Suppository 10 milliGRAM(s) Rectal daily PRN Constipation      ALLERGIES:  Allergies    No Known Allergies    Intolerances        LABS:                        10.5   12.2  )-----------( 422      ( 29 Jun 2017 12:33 )             30.1     06-29    128<L>  |  92<L>  |  16  ----------------------------<  183<H>  4.2   |  21<L>  |  0.90    Ca    8.4      29 Jun 2017 12:33            RADIOLOGY & ADDITIONAL TESTS: Studies reviewed.

## 2017-06-29 NOTE — PROGRESS NOTE ADULT - SUBJECTIVE AND OBJECTIVE BOX
Patient seen and examined at bedside.     SUBJECTIVE: No acute events overnight.  Pain tolerable.    Denies Chest Pain/SOB.    Denies Headache.    Denies Nausea/vomiting.      OBJECTIVE:   Vital Signs Last 24 Hrs  T(C): 36.7 (29 Jun 2017 12:35), Max: 37.8 (29 Jun 2017 06:15)  T(F): 98 (29 Jun 2017 12:35), Max: 100 (29 Jun 2017 06:15)  HR: 98 (29 Jun 2017 12:35) (85 - 111)  BP: 112/77 (29 Jun 2017 12:35) (103/68 - 128/81)  BP(mean): --  RR: 17 (29 Jun 2017 12:35) (16 - 18)  SpO2: 98% (29 Jun 2017 12:35) (96% - 99%)        NAD, non labored respirations  Affected extremity:          Dressing: clean/dry/intact          Drains: none         Sensation: [x ] intact to light touch  [ ] decreased                              Vascular: [x ] warm well perfused; capillary refill <3 seconds          Motor exam: [x ]         [ ] Upper extremity                   Earnest (c5)   Bi(c5/6)   WE(c6)   EE(c7)    (c8)                                                R           5              5            5             5             5                                               L            5              5            5             5            5         [x ] Lower extremity                   IP(L2)     Q(L3)     TA(L4)     EHL(L5)     GS(s1)                                                 R          5           5          5            3              3                                               L           5           5         5             2              2                                                       10.5   12.2  )-----------( 422      ( 29 Jun 2017 12:33 )             30.1         I&O's Detail    28 Jun 2017 07:01  -  29 Jun 2017 07:00  --------------------------------------------------------  IN:    IV PiggyBack: 100 mL    Oral Fluid: 360 mL  Total IN: 460 mL    OUT:    Voided: 375 mL  Total OUT: 375 mL    Total NET: 85 mL      29 Jun 2017 07:01  -  29 Jun 2017 13:34  --------------------------------------------------------  IN:    IV PiggyBack: 100 mL    Oral Fluid: 480 mL  Total IN: 580 mL    OUT:    Voided: 800 mL  Total OUT: 800 mL    Total NET: -220 mL                      A/P :  62y Male s/p Rev T10-Pelvis PSF 6/20/17      -    Pain control + bowel regimen  -    DVT ppx: SCDs    -    Periop abx    -    ADAT  -    Check AM labs  -    Weight bearing status:   WBAT        -    Physical Therapy  -    Resume home meds  -    Dispo planning

## 2017-06-29 NOTE — PROGRESS NOTE ADULT - SUBJECTIVE AND OBJECTIVE BOX
Interval Events: reviewed  Patient seen and examined at bedside.    Patient is a 62y old  Male who presents with a chief complaint of Back pain, RLE spasms, LLE pain for 3 weeks, getting worse. (20 Jun 2017 13:05)    is doing well with loose bowel movement and now is in isolation he stopped a laxative.  PAST MEDICAL & SURGICAL HISTORY:  Benign prostatic hypertrophy without lower urinary tract symptoms: BPH (benign prostatic hyperplasia)  Type 2 diabetes mellitus: Diabetes mellitus type 2 in obese  Essential hypertension: Hypertension  History of total knee replacement: Total knee replacement status, right      MEDICATIONS:  Pulmonary:    Antimicrobials:  vancomycin  IVPB 1500 milliGRAM(s) IV Intermittent once  piperacillin/tazobactam IVPB. 3.375 Gram(s) IV Intermittent once    Anticoagulants:    Cardiac:  tamsulosin 0.4 milliGRAM(s) Oral at bedtime      Allergies    No Known Allergies    Intolerances        Vital Signs Last 24 Hrs  T(C): 37.7 (29 Jun 2017 08:54), Max: 37.8 (29 Jun 2017 06:15)  T(F): 99.8 (29 Jun 2017 08:54), Max: 100 (29 Jun 2017 06:15)  HR: 101 (29 Jun 2017 08:54) (85 - 111)  BP: 105/73 (29 Jun 2017 08:54) (103/68 - 128/81)  BP(mean): --  RR: 16 (29 Jun 2017 08:54) (16 - 18)  SpO2: 96% (29 Jun 2017 08:54) (96% - 99%)    06-28 @ 07:01  -  06-29 @ 07:00  --------------------------------------------------------  IN: 460 mL / OUT: 375 mL / NET: 85 mL          LABS:      CBC Full  -  ( 28 Jun 2017 07:28 )  WBC Count : 12.6 K/uL  Hemoglobin : 11.2 g/dL  Hematocrit : 31.8 %  Platelet Count - Automated : 359 K/uL  Mean Cell Volume : 81.3 fL  Mean Cell Hemoglobin : 28.6 pg  Mean Cell Hemoglobin Concentration : 35.2 g/dL  Auto Neutrophil # : x  Auto Lymphocyte # : x  Auto Monocyte # : x  Auto Eosinophil # : x  Auto Basophil # : x  Auto Neutrophil % : x  Auto Lymphocyte % : x  Auto Monocyte % : x  Auto Eosinophil % : x  Auto Basophil % : x    06-28    126<L>  |  91<L>  |  13  ----------------------------<  222<H>  4.2   |  19<L>  |  0.90    Ca    8.7      28 Jun 2017 07:28                          RADIOLOGY & ADDITIONAL STUDIES (The following images were personally reviewed):  [place]:                            Yes     Urine output:               Yes          DVT prophylaxis:         Yes          Flattus:                          Yes          Bowel movement:       Yes

## 2017-06-29 NOTE — PROGRESS NOTE ADULT - PROBLEM SELECTOR PLAN 1
- Patient febrile post operatively. No clear source, but likely etiology aspiration pneumonia, less likely of spinal etiology.   - C/w Zosyn 3.375gr IV q 6h (last day 6/30), Vanc 1.5g q12h (last day 6/30)    Case discussed with Dr. Villasenor. ID signing off. Thank you for consulting our service. If further input is required, please re-consult.

## 2017-06-30 PROCEDURE — 99232 SBSQ HOSP IP/OBS MODERATE 35: CPT | Mod: GC

## 2017-06-30 RX ORDER — OXYCODONE HYDROCHLORIDE 5 MG/1
5 TABLET ORAL EVERY 4 HOURS
Qty: 0 | Refills: 0 | Status: DISCONTINUED | OUTPATIENT
Start: 2017-06-30 | End: 2017-07-06

## 2017-06-30 RX ORDER — OXYCODONE HYDROCHLORIDE 5 MG/1
10 TABLET ORAL EVERY 4 HOURS
Qty: 0 | Refills: 0 | Status: DISCONTINUED | OUTPATIENT
Start: 2017-06-30 | End: 2017-07-06

## 2017-06-30 RX ORDER — HYDROMORPHONE HYDROCHLORIDE 2 MG/ML
1 INJECTION INTRAMUSCULAR; INTRAVENOUS; SUBCUTANEOUS ONCE
Qty: 0 | Refills: 0 | Status: DISCONTINUED | OUTPATIENT
Start: 2017-06-30 | End: 2017-06-30

## 2017-06-30 RX ORDER — HYDROMORPHONE HYDROCHLORIDE 2 MG/ML
0.5 INJECTION INTRAMUSCULAR; INTRAVENOUS; SUBCUTANEOUS EVERY 4 HOURS
Qty: 0 | Refills: 0 | Status: DISCONTINUED | OUTPATIENT
Start: 2017-06-30 | End: 2017-07-06

## 2017-06-30 RX ADMIN — Medication 100 MILLIGRAM(S): at 15:00

## 2017-06-30 RX ADMIN — Medication 650 MILLIGRAM(S): at 15:12

## 2017-06-30 RX ADMIN — LIDOCAINE 1 PATCH: 4 CREAM TOPICAL at 00:30

## 2017-06-30 RX ADMIN — Medication 2: at 13:09

## 2017-06-30 RX ADMIN — Medication 1: at 23:12

## 2017-06-30 RX ADMIN — OXYCODONE HYDROCHLORIDE 10 MILLIGRAM(S): 5 TABLET ORAL at 00:12

## 2017-06-30 RX ADMIN — Medication 650 MILLIGRAM(S): at 07:13

## 2017-06-30 RX ADMIN — Medication 100 MILLIGRAM(S): at 07:13

## 2017-06-30 RX ADMIN — Medication 5 MILLIGRAM(S): at 00:34

## 2017-06-30 RX ADMIN — FAMOTIDINE 20 MILLIGRAM(S): 10 INJECTION INTRAVENOUS at 17:40

## 2017-06-30 RX ADMIN — HYDROMORPHONE HYDROCHLORIDE 1 MILLIGRAM(S): 2 INJECTION INTRAMUSCULAR; INTRAVENOUS; SUBCUTANEOUS at 17:56

## 2017-06-30 RX ADMIN — Medication 650 MILLIGRAM(S): at 14:12

## 2017-06-30 RX ADMIN — SIMETHICONE 80 MILLIGRAM(S): 80 TABLET, CHEWABLE ORAL at 14:12

## 2017-06-30 RX ADMIN — FAMOTIDINE 20 MILLIGRAM(S): 10 INJECTION INTRAVENOUS at 07:13

## 2017-06-30 RX ADMIN — SIMETHICONE 80 MILLIGRAM(S): 80 TABLET, CHEWABLE ORAL at 07:13

## 2017-06-30 RX ADMIN — Medication 1 TABLET(S): at 14:08

## 2017-06-30 RX ADMIN — Medication 650 MILLIGRAM(S): at 07:28

## 2017-06-30 RX ADMIN — CYCLOBENZAPRINE HYDROCHLORIDE 5 MILLIGRAM(S): 10 TABLET, FILM COATED ORAL at 07:13

## 2017-06-30 RX ADMIN — LIDOCAINE 1 PATCH: 4 CREAM TOPICAL at 23:30

## 2017-06-30 RX ADMIN — Medication 1 TABLET(S): at 13:08

## 2017-06-30 RX ADMIN — SENNA PLUS 2 TABLET(S): 8.6 TABLET ORAL at 23:12

## 2017-06-30 RX ADMIN — MAGNESIUM HYDROXIDE 30 MILLILITER(S): 400 TABLET, CHEWABLE ORAL at 13:13

## 2017-06-30 RX ADMIN — Medication 1: at 18:25

## 2017-06-30 RX ADMIN — SIMETHICONE 80 MILLIGRAM(S): 80 TABLET, CHEWABLE ORAL at 23:31

## 2017-06-30 RX ADMIN — SIMETHICONE 80 MILLIGRAM(S): 80 TABLET, CHEWABLE ORAL at 00:36

## 2017-06-30 RX ADMIN — OXYCODONE HYDROCHLORIDE 10 MILLIGRAM(S): 5 TABLET ORAL at 23:12

## 2017-06-30 RX ADMIN — Medication 1 TABLET(S): at 12:21

## 2017-06-30 RX ADMIN — HYDROMORPHONE HYDROCHLORIDE 1 MILLIGRAM(S): 2 INJECTION INTRAMUSCULAR; INTRAVENOUS; SUBCUTANEOUS at 17:41

## 2017-06-30 RX ADMIN — TAMSULOSIN HYDROCHLORIDE 0.4 MILLIGRAM(S): 0.4 CAPSULE ORAL at 23:12

## 2017-06-30 RX ADMIN — Medication 100 MILLIGRAM(S): at 23:13

## 2017-06-30 NOTE — PROGRESS NOTE ADULT - SUBJECTIVE AND OBJECTIVE BOX
Interval Events: reviewed  Patient seen and examined at bedside.    Patient is a 62y old  Male who presents with a chief complaint of Back pain, RLE spasms, LLE pain for 3 weeks, getting worse. (20 Jun 2017 13:05)    he is ding better and no diarrhoea  PAST MEDICAL & SURGICAL HISTORY:  Benign prostatic hypertrophy without lower urinary tract symptoms: BPH (benign prostatic hyperplasia)  Type 2 diabetes mellitus: Diabetes mellitus type 2 in obese  Essential hypertension: Hypertension  History of total knee replacement: Total knee replacement status, right      MEDICATIONS:  Pulmonary:    Antimicrobials:    Anticoagulants:    Cardiac:  tamsulosin 0.4 milliGRAM(s) Oral at bedtime      Allergies    No Known Allergies    Intolerances        Vital Signs Last 24 Hrs  T(C): 36.8 (30 Jun 2017 17:27), Max: 37.4 (30 Jun 2017 05:04)  T(F): 98.3 (30 Jun 2017 17:27), Max: 99.3 (30 Jun 2017 05:04)  HR: 78 (30 Jun 2017 20:25) (78 - 99)  BP: 128/85 (30 Jun 2017 20:25) (100/62 - 128/85)  BP(mean): --  RR: 18 (30 Jun 2017 20:25) (16 - 19)  SpO2: 100% (30 Jun 2017 20:25) (99% - 100%)    06-29 @ 07:01 - 06-30 @ 07:00  --------------------------------------------------------  IN: 1080 mL / OUT: 1350 mL / NET: -270 mL    06-30 @ 07:01 - 06-30 @ 21:23  --------------------------------------------------------  IN: 120 mL / OUT: 0 mL / NET: 120 mL          LABS:      CBC Full  -  ( 29 Jun 2017 12:33 )  WBC Count : 12.2 K/uL  Hemoglobin : 10.5 g/dL  Hematocrit : 30.1 %  Platelet Count - Automated : 422 K/uL  Mean Cell Volume : 81.4 fL  Mean Cell Hemoglobin : 28.4 pg  Mean Cell Hemoglobin Concentration : 34.9 g/dL  Auto Neutrophil # : x  Auto Lymphocyte # : x  Auto Monocyte # : x  Auto Eosinophil # : x  Auto Basophil # : x  Auto Neutrophil % : x  Auto Lymphocyte % : x  Auto Monocyte % : x  Auto Eosinophil % : x  Auto Basophil % : x    06-29    128<L>  |  92<L>  |  16  ----------------------------<  183<H>  4.2   |  21<L>  |  0.90    Ca    8.4      29 Jun 2017 12:33                          RADIOLOGY & ADDITIONAL STUDIES (The following images were personally reviewed):  [place]:                                   No  Urine output:               Yes         DVT prophylaxis:         Yes         Flattus:                          Yes         Bowel movement:       Yes

## 2017-06-30 NOTE — PROGRESS NOTE ADULT - SUBJECTIVE AND OBJECTIVE BOX
Patient seen and examined at bedside.     SUBJECTIVE: No acute events overnight.  Pain tolerable.    Denies Chest Pain/SOB.    Denies Headache.    Denies Nausea/vomiting.      OBJECTIVE:   Vital Signs Last 24 Hrs  T(C): 36.5 (30 Jun 2017 12:27), Max: 37.4 (30 Jun 2017 05:04)  T(F): 97.7 (30 Jun 2017 12:27), Max: 99.3 (30 Jun 2017 05:04)  HR: 89 (30 Jun 2017 12:27) (88 - 99)  BP: 116/82 (30 Jun 2017 12:27) (100/62 - 130/87)  BP(mean): --  RR: 19 (30 Jun 2017 12:27) (16 - 19)  SpO2: 99% (30 Jun 2017 12:27) (98% - 99%)        NAD, non labored respirations  Affected extremity:          Dressing: clean/dry/intact          Drains: none         Sensation: [x ] intact to light touch  [ ] decreased                              Vascular: [x ] warm well perfused; capillary refill <3 seconds          Motor exam: [x ]         [ ] Upper extremity                   Earnest (c5)   Bi(c5/6)   WE(c6)   EE(c7)    (c8)                                                R           5              5            5             5             5                                               L            5              5            5             5            5         [x ] Lower extremity                   IP(L2)     Q(L3)     TA(L4)     EHL(L5)     GS(s1)                                                 R          5           5          5            3              3                                               L           5           5         5             2              2                                                                       10.5   12.2  )-----------( 422      ( 29 Jun 2017 12:33 )             30.1           I&O's Detail    29 Jun 2017 07:01  -  30 Jun 2017 07:00  --------------------------------------------------------  IN:    IV PiggyBack: 600 mL    Oral Fluid: 480 mL  Total IN: 1080 mL    OUT:    Voided: 1350 mL  Total OUT: 1350 mL    Total NET: -270 mL      30 Jun 2017 07:01  -  30 Jun 2017 14:46  --------------------------------------------------------  IN:    Oral Fluid: 120 mL  Total IN: 120 mL    OUT:  Total OUT: 0 mL    Total NET: 120 mL                            A/P :  62y Male s/p Rev T10-Pelvis PSF 6/20/17      -    Pain control + bowel regimen  -    DVT ppx: SCDs    -    Periop abx    -    ADAT  -    Check AM labs  -    Weight bearing status:   WBAT        -    Physical Therapy  -    Resume home meds  -    Dispo planning

## 2017-06-30 NOTE — PROGRESS NOTE ADULT - PROBLEM SELECTOR PLAN 1
the patient is stable postoperatively. There was increase in the blood loss during surgery. The pain is controlled. Increase activity

## 2017-06-30 NOTE — PROGRESS NOTE ADULT - SUBJECTIVE AND OBJECTIVE BOX
ORTHO NOTE    [x ] Pt seen/examined.  [x ] Pt without any complaints/in NAD.    [ ] Pt complains of:      ROS: [ ] Fever  [ ] Chills  [ ] CP [ ] SOB [ ] Dysnea  [ ] Palpitations [ ] Cough [ ] N/V/C/D [ ] Paresthia [ ] Other     [x ] ROS  otherwise negative    .    PHYSICAL EXAM:    Vital Signs Last 24 Hrs  T(C): 37.2 (30 Jun 2017 09:20), Max: 37.4 (30 Jun 2017 05:04)  T(F): 99 (30 Jun 2017 09:20), Max: 99.3 (30 Jun 2017 05:04)  HR: 99 (30 Jun 2017 05:04) (88 - 99)  BP: 120/79 (30 Jun 2017 09:20) (100/62 - 130/87)  BP(mean): --  RR: 16 (30 Jun 2017 09:20) (16 - 18)  SpO2: 99% (30 Jun 2017 09:20) (98% - 99%)    I&O's Detail    29 Jun 2017 07:01  -  30 Jun 2017 07:00  --------------------------------------------------------  IN:    IV PiggyBack: 600 mL    Oral Fluid: 480 mL  Total IN: 1080 mL    OUT:    Voided: 1350 mL  Total OUT: 1350 mL    Total NET: -270 mL           CAPILLARY BLOOD GLUCOSE  209 (30 Jun 2017 11:31)  142 (30 Jun 2017 07:16)  129 (29 Jun 2017 20:26)  134 (29 Jun 2017 16:19)                      Neuro: AAOX3    Lungs: CTA, IS demonstrated    CV:    ABD: soft, nontender, bowel regimen    Ext: bilateral LE NVID strength 5/5    LABS                        10.5   12.2  )-----------( 422      ( 29 Jun 2017 12:33 )             30.1                                06-29    128<L>  |  92<L>  |  16  ----------------------------<  183<H>  4.2   |  21<L>  |  0.90    Ca    8.4      29 Jun 2017 12:33        [ ] Other Labs  [ ] None ordered            Please check or [place] when present:  •  Heart Failure:    [ ] Acute        [ ]  Acute on Chronic        [ ] Chronic         [ ] Diastolic     [ ]  Combined    •  ASIM:     [ ] ATN        [ ]  Renal medullary necrosis       [ ]  Renal cortical necrosis                  [ ] Other pathological Lesion:  •  CKD:  [ ] Stage I   [ ] Stage II  [ ] Stage III    [ ]Stage IV   [ ]  CKD V   [ ]  Other/Unspecified:    •  Abdominal Nutritional Status:   [ ] Malnutrition-See Nutrition note    [ ] Cachexia   [ ]  Other        [ ] Supplement ordered:            [ ] Morbid Obesity: BMI >=40         ASSESSMENT/PLAN:      STATUS POST: T10-sacrum PSF  Dressing removed. Incision cdi  AFVSS.  d/w Dr. Zhang and Char to d/c abx yesterday afternoon  CONTINUE:          [ ] PT- WBAT    [ ] DVT PPX- scd boots    [ ] Pain Mgt- po meds    [ ] Dispo plan- home, pending PT clearance

## 2017-07-01 LAB
ANION GAP SERPL CALC-SCNC: 15 MMOL/L — SIGNIFICANT CHANGE UP (ref 5–17)
BUN SERPL-MCNC: 16 MG/DL — SIGNIFICANT CHANGE UP (ref 7–23)
CALCIUM SERPL-MCNC: 8.9 MG/DL — SIGNIFICANT CHANGE UP (ref 8.4–10.5)
CHLORIDE SERPL-SCNC: 91 MMOL/L — LOW (ref 96–108)
CO2 SERPL-SCNC: 23 MMOL/L — SIGNIFICANT CHANGE UP (ref 22–31)
CREAT SERPL-MCNC: 0.9 MG/DL — SIGNIFICANT CHANGE UP (ref 0.5–1.3)
GLUCOSE SERPL-MCNC: 144 MG/DL — HIGH (ref 70–99)
HCT VFR BLD CALC: 29.4 % — LOW (ref 39–50)
HGB BLD-MCNC: 10.3 G/DL — LOW (ref 13–17)
MCHC RBC-ENTMCNC: 28.6 PG — SIGNIFICANT CHANGE UP (ref 27–34)
MCHC RBC-ENTMCNC: 35 G/DL — SIGNIFICANT CHANGE UP (ref 32–36)
MCV RBC AUTO: 81.7 FL — SIGNIFICANT CHANGE UP (ref 80–100)
PLATELET # BLD AUTO: 589 K/UL — HIGH (ref 150–400)
POTASSIUM SERPL-MCNC: 4.9 MMOL/L — SIGNIFICANT CHANGE UP (ref 3.5–5.3)
POTASSIUM SERPL-SCNC: 4.9 MMOL/L — SIGNIFICANT CHANGE UP (ref 3.5–5.3)
RBC # BLD: 3.6 M/UL — LOW (ref 4.2–5.8)
RBC # FLD: 14.3 % — SIGNIFICANT CHANGE UP (ref 10.3–16.9)
SODIUM SERPL-SCNC: 129 MMOL/L — LOW (ref 135–145)
WBC # BLD: 11.6 K/UL — HIGH (ref 3.8–10.5)
WBC # FLD AUTO: 11.6 K/UL — HIGH (ref 3.8–10.5)

## 2017-07-01 RX ORDER — MINERAL OIL
133 OIL (ML) MISCELLANEOUS ONCE
Qty: 0 | Refills: 0 | Status: COMPLETED | OUTPATIENT
Start: 2017-07-01 | End: 2017-07-01

## 2017-07-01 RX ORDER — MAGNESIUM HYDROXIDE 400 MG/1
30 TABLET, CHEWABLE ORAL DAILY
Qty: 0 | Refills: 0 | Status: DISCONTINUED | OUTPATIENT
Start: 2017-07-01 | End: 2017-07-06

## 2017-07-01 RX ADMIN — FAMOTIDINE 20 MILLIGRAM(S): 10 INJECTION INTRAVENOUS at 08:43

## 2017-07-01 RX ADMIN — OXYCODONE HYDROCHLORIDE 10 MILLIGRAM(S): 5 TABLET ORAL at 17:10

## 2017-07-01 RX ADMIN — Medication 133 MILLILITER(S): at 18:04

## 2017-07-01 RX ADMIN — Medication 650 MILLIGRAM(S): at 12:25

## 2017-07-01 RX ADMIN — Medication 30 MILLILITER(S): at 16:11

## 2017-07-01 RX ADMIN — OXYCODONE HYDROCHLORIDE 10 MILLIGRAM(S): 5 TABLET ORAL at 03:40

## 2017-07-01 RX ADMIN — SENNA PLUS 2 TABLET(S): 8.6 TABLET ORAL at 22:13

## 2017-07-01 RX ADMIN — Medication 1: at 12:17

## 2017-07-01 RX ADMIN — OXYCODONE HYDROCHLORIDE 10 MILLIGRAM(S): 5 TABLET ORAL at 12:16

## 2017-07-01 RX ADMIN — Medication 650 MILLIGRAM(S): at 11:25

## 2017-07-01 RX ADMIN — Medication 1 TABLET(S): at 11:26

## 2017-07-01 RX ADMIN — Medication 650 MILLIGRAM(S): at 21:03

## 2017-07-01 RX ADMIN — Medication 10 MILLIGRAM(S): at 13:17

## 2017-07-01 RX ADMIN — FAMOTIDINE 20 MILLIGRAM(S): 10 INJECTION INTRAVENOUS at 18:04

## 2017-07-01 RX ADMIN — Medication 2: at 18:03

## 2017-07-01 RX ADMIN — OXYCODONE HYDROCHLORIDE 10 MILLIGRAM(S): 5 TABLET ORAL at 13:15

## 2017-07-01 RX ADMIN — OXYCODONE HYDROCHLORIDE 10 MILLIGRAM(S): 5 TABLET ORAL at 22:05

## 2017-07-01 RX ADMIN — Medication 100 MILLIGRAM(S): at 13:17

## 2017-07-01 RX ADMIN — OXYCODONE HYDROCHLORIDE 10 MILLIGRAM(S): 5 TABLET ORAL at 23:43

## 2017-07-01 RX ADMIN — MAGNESIUM HYDROXIDE 30 MILLILITER(S): 400 TABLET, CHEWABLE ORAL at 03:43

## 2017-07-01 RX ADMIN — Medication 650 MILLIGRAM(S): at 04:00

## 2017-07-01 RX ADMIN — LIDOCAINE 1 PATCH: 4 CREAM TOPICAL at 22:13

## 2017-07-01 RX ADMIN — TAMSULOSIN HYDROCHLORIDE 0.4 MILLIGRAM(S): 0.4 CAPSULE ORAL at 22:13

## 2017-07-01 RX ADMIN — Medication 650 MILLIGRAM(S): at 20:44

## 2017-07-01 RX ADMIN — OXYCODONE HYDROCHLORIDE 10 MILLIGRAM(S): 5 TABLET ORAL at 00:00

## 2017-07-01 RX ADMIN — OXYCODONE HYDROCHLORIDE 10 MILLIGRAM(S): 5 TABLET ORAL at 04:00

## 2017-07-01 RX ADMIN — MAGNESIUM HYDROXIDE 30 MILLILITER(S): 400 TABLET, CHEWABLE ORAL at 18:03

## 2017-07-01 RX ADMIN — Medication 100 MILLIGRAM(S): at 22:13

## 2017-07-01 RX ADMIN — OXYCODONE HYDROCHLORIDE 10 MILLIGRAM(S): 5 TABLET ORAL at 16:11

## 2017-07-01 RX ADMIN — Medication 650 MILLIGRAM(S): at 03:39

## 2017-07-01 RX ADMIN — Medication 100 MILLIGRAM(S): at 08:43

## 2017-07-02 LAB
ANION GAP SERPL CALC-SCNC: 11 MMOL/L — SIGNIFICANT CHANGE UP (ref 5–17)
BUN SERPL-MCNC: 13 MG/DL — SIGNIFICANT CHANGE UP (ref 7–23)
CALCIUM SERPL-MCNC: 8.4 MG/DL — SIGNIFICANT CHANGE UP (ref 8.4–10.5)
CHLORIDE SERPL-SCNC: 97 MMOL/L — SIGNIFICANT CHANGE UP (ref 96–108)
CO2 SERPL-SCNC: 26 MMOL/L — SIGNIFICANT CHANGE UP (ref 22–31)
CREAT SERPL-MCNC: 0.9 MG/DL — SIGNIFICANT CHANGE UP (ref 0.5–1.3)
GLUCOSE SERPL-MCNC: 113 MG/DL — HIGH (ref 70–99)
HCT VFR BLD CALC: 28.8 % — LOW (ref 39–50)
HGB BLD-MCNC: 10 G/DL — LOW (ref 13–17)
MCHC RBC-ENTMCNC: 28.6 PG — SIGNIFICANT CHANGE UP (ref 27–34)
MCHC RBC-ENTMCNC: 34.7 G/DL — SIGNIFICANT CHANGE UP (ref 32–36)
MCV RBC AUTO: 82.3 FL — SIGNIFICANT CHANGE UP (ref 80–100)
PLATELET # BLD AUTO: 599 K/UL — HIGH (ref 150–400)
POTASSIUM SERPL-MCNC: 4.3 MMOL/L — SIGNIFICANT CHANGE UP (ref 3.5–5.3)
POTASSIUM SERPL-SCNC: 4.3 MMOL/L — SIGNIFICANT CHANGE UP (ref 3.5–5.3)
RBC # BLD: 3.5 M/UL — LOW (ref 4.2–5.8)
RBC # FLD: 14.5 % — SIGNIFICANT CHANGE UP (ref 10.3–16.9)
SODIUM SERPL-SCNC: 134 MMOL/L — LOW (ref 135–145)
WBC # BLD: 9.3 K/UL — SIGNIFICANT CHANGE UP (ref 3.8–10.5)
WBC # FLD AUTO: 9.3 K/UL — SIGNIFICANT CHANGE UP (ref 3.8–10.5)

## 2017-07-02 RX ADMIN — Medication 650 MILLIGRAM(S): at 21:50

## 2017-07-02 RX ADMIN — FAMOTIDINE 20 MILLIGRAM(S): 10 INJECTION INTRAVENOUS at 06:13

## 2017-07-02 RX ADMIN — Medication 650 MILLIGRAM(S): at 21:02

## 2017-07-02 RX ADMIN — Medication 1: at 17:52

## 2017-07-02 RX ADMIN — Medication 1 TABLET(S): at 12:16

## 2017-07-02 RX ADMIN — TAMSULOSIN HYDROCHLORIDE 0.4 MILLIGRAM(S): 0.4 CAPSULE ORAL at 21:02

## 2017-07-02 RX ADMIN — Medication 650 MILLIGRAM(S): at 14:26

## 2017-07-02 RX ADMIN — Medication 650 MILLIGRAM(S): at 05:43

## 2017-07-02 RX ADMIN — OXYCODONE HYDROCHLORIDE 10 MILLIGRAM(S): 5 TABLET ORAL at 22:50

## 2017-07-02 RX ADMIN — Medication 650 MILLIGRAM(S): at 06:17

## 2017-07-02 RX ADMIN — OXYCODONE HYDROCHLORIDE 10 MILLIGRAM(S): 5 TABLET ORAL at 15:30

## 2017-07-02 RX ADMIN — LIDOCAINE 1 PATCH: 4 CREAM TOPICAL at 21:02

## 2017-07-02 RX ADMIN — OXYCODONE HYDROCHLORIDE 10 MILLIGRAM(S): 5 TABLET ORAL at 14:26

## 2017-07-02 RX ADMIN — CYCLOBENZAPRINE HYDROCHLORIDE 5 MILLIGRAM(S): 10 TABLET, FILM COATED ORAL at 12:14

## 2017-07-02 RX ADMIN — Medication 100 MILLIGRAM(S): at 06:13

## 2017-07-02 RX ADMIN — OXYCODONE HYDROCHLORIDE 10 MILLIGRAM(S): 5 TABLET ORAL at 06:33

## 2017-07-02 RX ADMIN — Medication 650 MILLIGRAM(S): at 13:19

## 2017-07-02 RX ADMIN — Medication 1: at 21:02

## 2017-07-02 RX ADMIN — OXYCODONE HYDROCHLORIDE 10 MILLIGRAM(S): 5 TABLET ORAL at 21:51

## 2017-07-02 RX ADMIN — Medication 1: at 12:16

## 2017-07-02 RX ADMIN — LIDOCAINE 1 PATCH: 4 CREAM TOPICAL at 10:00

## 2017-07-02 RX ADMIN — FAMOTIDINE 20 MILLIGRAM(S): 10 INJECTION INTRAVENOUS at 17:52

## 2017-07-02 NOTE — PROGRESS NOTE ADULT - SUBJECTIVE AND OBJECTIVE BOX
Patient seen and examined at bedside.     SUBJECTIVE: No acute events overnight.  Pain tolerable.    Denies Chest Pain/SOB.    Denies Headache.    Denies Nausea/vomiting.      OBJECTIVE:   Vital Signs Last 24 Hrs  T(C): 36.3 (02 Jul 2017 06:13), Max: 37.3 (01 Jul 2017 20:32)  T(F): 97.4 (02 Jul 2017 06:13), Max: 99.1 (01 Jul 2017 20:32)  HR: 90 (02 Jul 2017 06:13) (74 - 94)  BP: 127/80 (02 Jul 2017 06:13) (89/58 - 127/87)  BP(mean): --  RR: 17 (02 Jul 2017 06:13) (16 - 17)  SpO2: 99% (02 Jul 2017 06:13) (96% - 100%)        NAD, non labored respirations  Affected extremity:          Dressing: clean/dry/intact          Drains: none         Sensation: [x ] intact to light touch  [ ] decreased                              Vascular: [x ] warm well perfused; capillary refill <3 seconds          Motor exam: [x ]         [ ] Upper extremity                   Earnest (c5)   Bi(c5/6)   WE(c6)   EE(c7)    (c8)                                                R           5              5            5             5             5                                               L            5              5            5             5            5         [x ] Lower extremity                   IP(L2)     Q(L3)     TA(L4)     EHL(L5)     GS(s1)                                                 R          5           5          5            3              3                                               L           5           5         5             2              2                                 I&O's Detail    01 Jul 2017 07:01  -  02 Jul 2017 07:00  --------------------------------------------------------  IN:    Oral Fluid: 1140 mL  Total IN: 1140 mL    OUT:    Indwelling Catheter - Urethral: 2050 mL    Voided: 1300 mL  Total OUT: 3350 mL    Total NET: -2210 mL                                                                        10.0   9.3   )-----------( 599      ( 02 Jul 2017 06:03 )             28.8                   A/P :  62y Male s/p Rev T10-Pelvis PSF 6/20/17      -    Pain control + bowel regimen  -    DVT ppx: SCDs    -    Periop abx    -    ADAT  -    Check AM labs  -    Weight bearing status:   WBAT        -    Physical Therapy  -    Resume home meds  -    Dispo planning

## 2017-07-03 PROCEDURE — 99232 SBSQ HOSP IP/OBS MODERATE 35: CPT | Mod: GC

## 2017-07-03 RX ORDER — TAMSULOSIN HYDROCHLORIDE 0.4 MG/1
0.4 CAPSULE ORAL ONCE
Qty: 0 | Refills: 0 | Status: COMPLETED | OUTPATIENT
Start: 2017-07-03 | End: 2017-07-03

## 2017-07-03 RX ADMIN — OXYCODONE HYDROCHLORIDE 10 MILLIGRAM(S): 5 TABLET ORAL at 09:12

## 2017-07-03 RX ADMIN — OXYCODONE HYDROCHLORIDE 10 MILLIGRAM(S): 5 TABLET ORAL at 09:50

## 2017-07-03 RX ADMIN — Medication 650 MILLIGRAM(S): at 12:16

## 2017-07-03 RX ADMIN — Medication 650 MILLIGRAM(S): at 13:35

## 2017-07-03 RX ADMIN — FAMOTIDINE 20 MILLIGRAM(S): 10 INJECTION INTRAVENOUS at 17:42

## 2017-07-03 RX ADMIN — TAMSULOSIN HYDROCHLORIDE 0.4 MILLIGRAM(S): 0.4 CAPSULE ORAL at 21:15

## 2017-07-03 RX ADMIN — FAMOTIDINE 20 MILLIGRAM(S): 10 INJECTION INTRAVENOUS at 05:02

## 2017-07-03 RX ADMIN — CYCLOBENZAPRINE HYDROCHLORIDE 5 MILLIGRAM(S): 10 TABLET, FILM COATED ORAL at 12:16

## 2017-07-03 RX ADMIN — OXYCODONE HYDROCHLORIDE 10 MILLIGRAM(S): 5 TABLET ORAL at 19:51

## 2017-07-03 RX ADMIN — Medication 100 MILLIGRAM(S): at 05:02

## 2017-07-03 RX ADMIN — Medication 650 MILLIGRAM(S): at 05:03

## 2017-07-03 RX ADMIN — Medication 1: at 12:17

## 2017-07-03 RX ADMIN — Medication 650 MILLIGRAM(S): at 05:41

## 2017-07-03 RX ADMIN — Medication 1 TABLET(S): at 11:25

## 2017-07-03 RX ADMIN — Medication 1: at 21:14

## 2017-07-03 RX ADMIN — Medication 650 MILLIGRAM(S): at 22:14

## 2017-07-03 RX ADMIN — LIDOCAINE 1 PATCH: 4 CREAM TOPICAL at 12:33

## 2017-07-03 RX ADMIN — TAMSULOSIN HYDROCHLORIDE 0.4 MILLIGRAM(S): 0.4 CAPSULE ORAL at 18:03

## 2017-07-03 RX ADMIN — Medication 650 MILLIGRAM(S): at 21:14

## 2017-07-03 NOTE — PROGRESS NOTE ADULT - SUBJECTIVE AND OBJECTIVE BOX
Interval Events: reviewed  Patient seen and examined at bedside.    Patient is a 62y old  Male who presents with a chief complaint of Back pain, RLE spasms, LLE pain for 3 weeks, getting worse. (20 Jun 2017 13:05)    is doing much better.  Gaudencio was taken out  today but he did not urinate yet.  PAST MEDICAL & SURGICAL HISTORY:  Benign prostatic hypertrophy without lower urinary tract symptoms: BPH (benign prostatic hyperplasia)  Type 2 diabetes mellitus: Diabetes mellitus type 2 in obese  Essential hypertension: Hypertension  History of total knee replacement: Total knee replacement status, right      MEDICATIONS:  Pulmonary:    Antimicrobials:    Anticoagulants:    Cardiac:  tamsulosin 0.4 milliGRAM(s) Oral at bedtime      Allergies    No Known Allergies    Intolerances        Vital Signs Last 24 Hrs  T(C): 37.1 (03 Jul 2017 20:35), Max: 37.1 (03 Jul 2017 20:35)  T(F): 98.8 (03 Jul 2017 20:35), Max: 98.8 (03 Jul 2017 20:35)  HR: 70 (03 Jul 2017 20:35) (70 - 95)  BP: 135/80 (03 Jul 2017 20:35) (109/74 - 143/84)  BP(mean): --  RR: 17 (03 Jul 2017 20:35) (16 - 18)  SpO2: 99% (03 Jul 2017 20:35) (96% - 99%)    07-02 @ 07:01 - 07-03 @ 07:00  --------------------------------------------------------  IN: 1540 mL / OUT: 4800 mL / NET: -3260 mL    07-03 @ 07:01 - 07-03 @ 21:34  --------------------------------------------------------  IN: 880 mL / OUT: 1500 mL / NET: -620 mL          LABS:      CBC Full  -  ( 02 Jul 2017 06:03 )  WBC Count : 9.3 K/uL  Hemoglobin : 10.0 g/dL  Hematocrit : 28.8 %  Platelet Count - Automated : 599 K/uL  Mean Cell Volume : 82.3 fL  Mean Cell Hemoglobin : 28.6 pg  Mean Cell Hemoglobin Concentration : 34.7 g/dL  Auto Neutrophil # : x  Auto Lymphocyte # : x  Auto Monocyte # : x  Auto Eosinophil # : x  Auto Basophil # : x  Auto Neutrophil % : x  Auto Lymphocyte % : x  Auto Monocyte % : x  Auto Eosinophil % : x  Auto Basophil % : x    07-02    134<L>  |  97  |  13  ----------------------------<  113<H>  4.3   |  26  |  0.90    Ca    8.4      02 Jul 2017 06:03                          RADIOLOGY & ADDITIONAL STUDIES (The following images were personally reviewed):  [place]:                                   No  Urine output:               Yes          DVT prophylaxis:         Yes          Flattus:                          Yes          Bowel movement:       Yes

## 2017-07-03 NOTE — PROGRESS NOTE ADULT - SUBJECTIVE AND OBJECTIVE BOX
Patient seen and examined at bedside.     SUBJECTIVE: No acute events overnight.  Pain tolerable.    Denies Chest Pain/SOB.    Denies Headache.    Denies Nausea/vomiting.      OBJECTIVE:   Vital Signs Last 24 Hrs  T(C): 36.5 (03 Jul 2017 05:01), Max: 36.5 (03 Jul 2017 05:01)  T(F): 97.7 (03 Jul 2017 05:01), Max: 97.7 (03 Jul 2017 05:01)  HR: 78 (03 Jul 2017 05:01) (78 - 99)  BP: 109/74 (03 Jul 2017 05:01) (109/74 - 132/94)  BP(mean): --  RR: 16 (03 Jul 2017 05:01) (16 - 18)  SpO2: 98% (03 Jul 2017 05:01) (97% - 99%)        NAD, non labored respirations  Affected extremity:          Dressing: clean/dry/intact          Drains: none         Sensation: [x ] intact to light touch  [ ] decreased                              Vascular: [x ] warm well perfused; capillary refill <3 seconds          Motor exam: [x ]         [ ] Upper extremity                   Earnest (c5)   Bi(c5/6)   WE(c6)   EE(c7)    (c8)                                                R           5              5            5             5             5                                               L            5              5            5             5            5         [x ] Lower extremity                   IP(L2)     Q(L3)     TA(L4)     EHL(L5)     GS(s1)                                                 R          5           5          5            3              3                                               L           5           5         5             2              2                                 I&O's Detail    02 Jul 2017 07:01  -  03 Jul 2017 07:00  --------------------------------------------------------  IN:    Oral Fluid: 1540 mL  Total IN: 1540 mL    OUT:    Indwelling Catheter - Urethral: 4800 mL  Total OUT: 4800 mL    Total NET: -3260 mL                                      10.0   9.3   )-----------( 599      ( 02 Jul 2017 06:03 )             28.8                 A/P :  62y Male s/p Rev T10-Pelvis PSF 6/20/17      -    Pain control + bowel regimen  -    DVT ppx: SCDs    -    Periop abx    -    ADAT  -    Check AM labs  -    Weight bearing status:   WBAT        -    Physical Therapy  -    Resume home meds  -    Dispo planning

## 2017-07-03 NOTE — PROGRESS NOTE ADULT - SUBJECTIVE AND OBJECTIVE BOX
ORTHO NOTE    [x ] Pt seen/examined.  [x ] Pt without any complaints/in NAD.    [ ] Pt complains of:      ROS: [ ] Fever  [ ] Chills  [ ] CP [ ] SOB [ ] Dysnea  [ ] Palpitations [ ] Cough [ ] N/V/C/D [ ] Paresthia [ ] Other     [x ] ROS  otherwise negative    .    PHYSICAL EXAM:    Vital Signs Last 24 Hrs  T(C): 36.2 (03 Jul 2017 08:49), Max: 36.5 (03 Jul 2017 05:01)  T(F): 97.1 (03 Jul 2017 08:49), Max: 97.7 (03 Jul 2017 05:01)  HR: 95 (03 Jul 2017 08:49) (78 - 99)  BP: 123/78 (03 Jul 2017 08:49) (109/74 - 132/94)  BP(mean): --  RR: 17 (03 Jul 2017 08:49) (16 - 18)  SpO2: 96% (03 Jul 2017 08:49) (96% - 99%)    I&O's Detail    02 Jul 2017 07:01  -  03 Jul 2017 07:00  --------------------------------------------------------  IN:    Oral Fluid: 1540 mL  Total IN: 1540 mL    OUT:    Indwelling Catheter - Urethral: 4800 mL  Total OUT: 4800 mL    Total NET: -3260 mL      03 Jul 2017 07:01  -  03 Jul 2017 11:41  --------------------------------------------------------  IN:    Oral Fluid: 230 mL  Total IN: 230 mL    OUT:    Indwelling Catheter - Urethral: 400 mL  Total OUT: 400 mL    Total NET: -170 mL           CAPILLARY BLOOD GLUCOSE  162 (03 Jul 2017 11:29)  111 (03 Jul 2017 06:57)  162 (02 Jul 2017 21:00)  191 (02 Jul 2017 16:05)  169 (02 Jul 2017 12:03)                      Neuro: AAOX3    Lungs: CTA, IS demonstrated    CV:    ABD: soft, nontender, +BM    Ext: bilateral LE NVID strength 5/5    LABS                        10.0   9.3   )-----------( 599      ( 02 Jul 2017 06:03 )             28.8                                07-02    134<L>  |  97  |  13  ----------------------------<  113<H>  4.3   |  26  |  0.90    Ca    8.4      02 Jul 2017 06:03        [ ] Other Labs  [ ] None ordered            Please check or [place] [facility] when present:  •  Heart Failure:    [ ] Acute        [ ]  Acute on Chronic        [ ] Chronic         [ ] Diastolic     [ ]  Combined    •  ASIM:     [ ] ATN        [ ]  Renal medullary necrosis       [ ]  Renal cortical necrosis                  [ ] Other pathological Lesion:  •  CKD:  [ ] Stage I   [ ] Stage II  [ ] Stage III    [ ]Stage IV   [ ]  CKD V   [ ]  Other/Unspecified:    •  Abdominal Nutritional Status:   [ ] Malnutrition-See Nutrition note    [ ] Cachexia   [ ]  Other        [ ] Supplement ordered:            [ ] Morbid Obesity: BMI >=40         ASSESSMENT/PLAN:      STATUS POST: T10-pelvis psf  d/c arellano TOV today after improvement with PT and +BM on 7/2 and 7/3    CONTINUE:          [ ] PT- WBAT    [ ] DVT PPX- scd boots    [ ] Pain Mgt- po meds    [ ] Dispo plan- home, pending PT clearance

## 2017-07-04 LAB
ANION GAP SERPL CALC-SCNC: 11 MMOL/L — SIGNIFICANT CHANGE UP (ref 5–17)
BUN SERPL-MCNC: 12 MG/DL — SIGNIFICANT CHANGE UP (ref 7–23)
CALCIUM SERPL-MCNC: 8.2 MG/DL — LOW (ref 8.4–10.5)
CHLORIDE SERPL-SCNC: 99 MMOL/L — SIGNIFICANT CHANGE UP (ref 96–108)
CO2 SERPL-SCNC: 24 MMOL/L — SIGNIFICANT CHANGE UP (ref 22–31)
CREAT SERPL-MCNC: 0.8 MG/DL — SIGNIFICANT CHANGE UP (ref 0.5–1.3)
GLUCOSE SERPL-MCNC: 104 MG/DL — HIGH (ref 70–99)
HCT VFR BLD CALC: 26.6 % — LOW (ref 39–50)
HGB BLD-MCNC: 8.9 G/DL — LOW (ref 13–17)
MCHC RBC-ENTMCNC: 27.7 PG — SIGNIFICANT CHANGE UP (ref 27–34)
MCHC RBC-ENTMCNC: 33.5 G/DL — SIGNIFICANT CHANGE UP (ref 32–36)
MCV RBC AUTO: 82.9 FL — SIGNIFICANT CHANGE UP (ref 80–100)
PLATELET # BLD AUTO: 646 K/UL — HIGH (ref 150–400)
POTASSIUM SERPL-MCNC: 3.9 MMOL/L — SIGNIFICANT CHANGE UP (ref 3.5–5.3)
POTASSIUM SERPL-SCNC: 3.9 MMOL/L — SIGNIFICANT CHANGE UP (ref 3.5–5.3)
RBC # BLD: 3.21 M/UL — LOW (ref 4.2–5.8)
RBC # FLD: 14.2 % — SIGNIFICANT CHANGE UP (ref 10.3–16.9)
SODIUM SERPL-SCNC: 134 MMOL/L — LOW (ref 135–145)
WBC # BLD: 7.6 K/UL — SIGNIFICANT CHANGE UP (ref 3.8–10.5)
WBC # FLD AUTO: 7.6 K/UL — SIGNIFICANT CHANGE UP (ref 3.8–10.5)

## 2017-07-04 RX ADMIN — Medication 650 MILLIGRAM(S): at 05:57

## 2017-07-04 RX ADMIN — Medication 650 MILLIGRAM(S): at 14:40

## 2017-07-04 RX ADMIN — TAMSULOSIN HYDROCHLORIDE 0.4 MILLIGRAM(S): 0.4 CAPSULE ORAL at 21:16

## 2017-07-04 RX ADMIN — CYCLOBENZAPRINE HYDROCHLORIDE 5 MILLIGRAM(S): 10 TABLET, FILM COATED ORAL at 12:11

## 2017-07-04 RX ADMIN — Medication 1: at 21:16

## 2017-07-04 RX ADMIN — LIDOCAINE 1 PATCH: 4 CREAM TOPICAL at 21:20

## 2017-07-04 RX ADMIN — FAMOTIDINE 20 MILLIGRAM(S): 10 INJECTION INTRAVENOUS at 05:30

## 2017-07-04 RX ADMIN — Medication 650 MILLIGRAM(S): at 06:57

## 2017-07-04 RX ADMIN — LIDOCAINE 1 PATCH: 4 CREAM TOPICAL at 10:20

## 2017-07-04 RX ADMIN — Medication 650 MILLIGRAM(S): at 22:17

## 2017-07-04 RX ADMIN — FAMOTIDINE 20 MILLIGRAM(S): 10 INJECTION INTRAVENOUS at 17:18

## 2017-07-04 RX ADMIN — OXYCODONE HYDROCHLORIDE 10 MILLIGRAM(S): 5 TABLET ORAL at 10:25

## 2017-07-04 RX ADMIN — OXYCODONE HYDROCHLORIDE 10 MILLIGRAM(S): 5 TABLET ORAL at 11:10

## 2017-07-04 RX ADMIN — Medication 650 MILLIGRAM(S): at 21:17

## 2017-07-04 RX ADMIN — Medication 1 TABLET(S): at 12:07

## 2017-07-04 RX ADMIN — Medication 650 MILLIGRAM(S): at 13:50

## 2017-07-04 NOTE — CONSULT NOTE ADULT - SUBJECTIVE AND OBJECTIVE BOX
HPI:  62M hx htn, dm came in with worsening back pain. Pt underwent a revision spinal surgery 6/20. Since then patient has failed his TOV x3. He reports this is the first time having urinary retention and he has had multiple orthopedic surgeries in the past. He reports he is passing gas, having BMs and is getting OOB. Pt lives in [place] and he reports no trouble urinating at home. He says he takes saw palmetto which helps him empty his bladder. He has never been on flomax. Some nocturia x3 otherwise no other LUTS. He reports he gets yearly prostate exams which have been normal. No other  history         Vital Signs Last 24 Hrs  T(C): 36.9 (04 Jul 2017 08:46), Max: 37.1 (03 Jul 2017 20:35)  T(F): 98.5 (04 Jul 2017 08:46), Max: 98.8 (03 Jul 2017 20:35)  HR: 90 (04 Jul 2017 08:46) (70 - 90)  BP: 122/89 (04 Jul 2017 08:46) (108/71 - 143/84)  BP(mean): --  RR: 16 (04 Jul 2017 08:46) (16 - 18)  SpO2: 99% (04 Jul 2017 08:46) (96% - 99%)  I&O's Summary    03 Jul 2017 07:01  -  04 Jul 2017 07:00  --------------------------------------------------------  IN: 880 mL / OUT: 3050 mL / NET: -2170 mL        PE:  Gen: NAD  Abd: NTND  : arellano draining clear. penis uncircumcised scrotum b/l non-tender, no masses appreciated     LABS:                        8.9    7.6   )-----------( 646      ( 04 Jul 2017 06:41 )             26.6     07-04    134<L>  |  99  |  12  ----------------------------<  104<H>  3.9   |  24  |  0.80    Ca    8.2<L>      04 Jul 2017 06:41        Cultures      A/P: 62M s/p revision spinal surgery 6/20. Failed TOV x3  1- Home with arellano catheter; see Dr Barr in his office Friday ([phone]) for TOV   2- Flomax 0.4mg qhs  3- OOB amb, IS  4- Repeat UA, Ucx with multiple catheter insertions  5- Lidocaine 2% jelly to urethral meatus for discomfort  6- D/w  team

## 2017-07-04 NOTE — OCCUPATIONAL THERAPY INITIAL EVALUATION ADULT - ADDITIONAL COMMENTS
Patient was occasionally ambulating with SC or RW in case of increased lumbar pain and bilateral lower extremities weakness, otherwise independent with Activities of Daily Living.

## 2017-07-04 NOTE — OCCUPATIONAL THERAPY INITIAL EVALUATION ADULT - RANGE OF MOTION EXAMINATION, UPPER EXTREMITY
bilateral UE Active ROM was WNL (within normal limits)/bilateral UE Passive ROM was WNL (within normal limits)

## 2017-07-04 NOTE — PROGRESS NOTE ADULT - SUBJECTIVE AND OBJECTIVE BOX
Patient seen and examined at bedside.     SUBJECTIVE: No acute events overnight.  Pain tolerable.    Denies Chest Pain/SOB.    Denies Headache.    Denies Nausea/vomiting.      OBJECTIVE:   Vital Signs Last 24 Hrs  T(C): 36.4 (04 Jul 2017 01:09), Max: 37.1 (03 Jul 2017 20:35)  T(F): 97.6 (04 Jul 2017 01:09), Max: 98.8 (03 Jul 2017 20:35)  HR: 81 (04 Jul 2017 01:09) (70 - 95)  BP: 120/78 (04 Jul 2017 01:09) (109/74 - 143/84)  BP(mean): --  RR: 17 (04 Jul 2017 01:09) (16 - 18)  SpO2: 98% (04 Jul 2017 01:09) (96% - 99%)        NAD, non labored respirations  Affected extremity:          Dressing: clean/dry/intact          Drains: none         Sensation: [x ] intact to light touch  [ ] decreased                              Vascular: [x ] warm well perfused; capillary refill <3 seconds          Motor exam: [x ]         [ ] Upper extremity                   Earnest (c5)   Bi(c5/6)   WE(c6)   EE(c7)    (c8)                                                R           5              5            5             5             5                                               L            5              5            5             5            5         [x ] Lower extremity                   IP(L2)     Q(L3)     TA(L4)     EHL(L5)     GS(s1)                                                 R          5           5          5            3              3                                               L           5           5         5             2              2                                 I&O's Detail    02 Jul 2017 07:01  -  03 Jul 2017 07:00  --------------------------------------------------------  IN:    Oral Fluid: 1540 mL  Total IN: 1540 mL    OUT:    Indwelling Catheter - Urethral: 4800 mL  Total OUT: 4800 mL    Total NET: -3260 mL      03 Jul 2017 07:01  -  04 Jul 2017 04:43  --------------------------------------------------------  IN:    Oral Fluid: 880 mL  Total IN: 880 mL    OUT:    Indwelling Catheter - Urethral: 2050 mL  Total OUT: 2050 mL    Total NET: -1170 mL                                            10.0   9.3   )-----------( 599      ( 02 Jul 2017 06:03 )             28.8                   A/P :  62y Male s/p Rev T10-Pelvis PSF 6/20/17      -    Pain control + bowel regimen  -    DVT ppx: SCDs    -    Periop abx    -    ADAT  -    Check AM labs  -    Weight bearing status:   WBAT        -    Physical Therapy  -    Resume home meds  -    Dispo planning

## 2017-07-04 NOTE — OCCUPATIONAL THERAPY INITIAL EVALUATION ADULT - GENERAL OBSERVATIONS, REHAB EVAL
Right hand dominant. Patient cleared for Occupational Therapy by DANDRE Butt. Patient received supine in non-acute distress, family present. +IV heplock, + bilateral compression stockings, + thoracic incision with staples C/D/I, +edema bilateral feet left > right. Patient denies pain.

## 2017-07-04 NOTE — OCCUPATIONAL THERAPY INITIAL EVALUATION ADULT - PERTINENT HX OF CURRENT PROBLEM, REHAB EVAL
62 year old gentleman s/p T10 to sacrum posterior decompression, T10 to sacrum posterior segmental instrumentation, T10 to sacrum posterior spinal fusion, pelvic fixation, and Neha osteotomy at L3L4, L4L5, and L5S1 6/6/15. 3 weeks ago patient was lifting something heavy and felt and heard a pop in his back. Since then patient continued to have Back pain, RLE spasms, LLE pain for 3 weeks, getting worse. Revision PSF T10-pelvis 6/20.

## 2017-07-04 NOTE — OCCUPATIONAL THERAPY INITIAL EVALUATION ADULT - STANDING BALANCE: DYNAMIC, REHAB EVAL
ambulated total of 10 feet with RW and CGA, impaired motor control bilateral lower extremities, VC for pacing/fair minus

## 2017-07-05 LAB
ANION GAP SERPL CALC-SCNC: 11 MMOL/L — SIGNIFICANT CHANGE UP (ref 5–17)
APPEARANCE UR: CLEAR — SIGNIFICANT CHANGE UP
BILIRUB UR-MCNC: NEGATIVE — SIGNIFICANT CHANGE UP
BUN SERPL-MCNC: 10 MG/DL — SIGNIFICANT CHANGE UP (ref 7–23)
CALCIUM SERPL-MCNC: 8.2 MG/DL — LOW (ref 8.4–10.5)
CHLORIDE SERPL-SCNC: 97 MMOL/L — SIGNIFICANT CHANGE UP (ref 96–108)
CO2 SERPL-SCNC: 24 MMOL/L — SIGNIFICANT CHANGE UP (ref 22–31)
COLOR SPEC: YELLOW — SIGNIFICANT CHANGE UP
CREAT SERPL-MCNC: 0.7 MG/DL — SIGNIFICANT CHANGE UP (ref 0.5–1.3)
DIFF PNL FLD: (no result)
GLUCOSE SERPL-MCNC: 109 MG/DL — HIGH (ref 70–99)
GLUCOSE UR QL: NEGATIVE — SIGNIFICANT CHANGE UP
HCT VFR BLD CALC: 26.3 % — LOW (ref 39–50)
HGB BLD-MCNC: 9 G/DL — LOW (ref 13–17)
KETONES UR-MCNC: NEGATIVE — SIGNIFICANT CHANGE UP
LEUKOCYTE ESTERASE UR-ACNC: NEGATIVE — SIGNIFICANT CHANGE UP
MCHC RBC-ENTMCNC: 28.4 PG — SIGNIFICANT CHANGE UP (ref 27–34)
MCHC RBC-ENTMCNC: 34.2 G/DL — SIGNIFICANT CHANGE UP (ref 32–36)
MCV RBC AUTO: 83 FL — SIGNIFICANT CHANGE UP (ref 80–100)
NITRITE UR-MCNC: NEGATIVE — SIGNIFICANT CHANGE UP
PH UR: 6 — SIGNIFICANT CHANGE UP (ref 5–8)
PLATELET # BLD AUTO: 594 K/UL — HIGH (ref 150–400)
POTASSIUM SERPL-MCNC: 3.9 MMOL/L — SIGNIFICANT CHANGE UP (ref 3.5–5.3)
POTASSIUM SERPL-SCNC: 3.9 MMOL/L — SIGNIFICANT CHANGE UP (ref 3.5–5.3)
PROT UR-MCNC: NEGATIVE MG/DL — SIGNIFICANT CHANGE UP
RBC # BLD: 3.17 M/UL — LOW (ref 4.2–5.8)
RBC # FLD: 14.5 % — SIGNIFICANT CHANGE UP (ref 10.3–16.9)
SODIUM SERPL-SCNC: 132 MMOL/L — LOW (ref 135–145)
SP GR SPEC: <=1.005 — SIGNIFICANT CHANGE UP (ref 1–1.03)
UROBILINOGEN FLD QL: 0.2 E.U./DL — SIGNIFICANT CHANGE UP
WBC # BLD: 6.8 K/UL — SIGNIFICANT CHANGE UP (ref 3.8–10.5)
WBC # FLD AUTO: 6.8 K/UL — SIGNIFICANT CHANGE UP (ref 3.8–10.5)

## 2017-07-05 PROCEDURE — 99233 SBSQ HOSP IP/OBS HIGH 50: CPT | Mod: GC

## 2017-07-05 RX ORDER — SIMETHICONE 80 MG/1
80 TABLET, CHEWABLE ORAL EVERY 6 HOURS
Qty: 0 | Refills: 0 | Status: DISCONTINUED | OUTPATIENT
Start: 2017-07-05 | End: 2017-07-06

## 2017-07-05 RX ADMIN — OXYCODONE HYDROCHLORIDE 10 MILLIGRAM(S): 5 TABLET ORAL at 03:01

## 2017-07-05 RX ADMIN — OXYCODONE HYDROCHLORIDE 10 MILLIGRAM(S): 5 TABLET ORAL at 04:01

## 2017-07-05 RX ADMIN — SIMETHICONE 80 MILLIGRAM(S): 80 TABLET, CHEWABLE ORAL at 17:28

## 2017-07-05 RX ADMIN — Medication 1: at 12:52

## 2017-07-05 RX ADMIN — Medication 1 TABLET(S): at 12:51

## 2017-07-05 RX ADMIN — FAMOTIDINE 20 MILLIGRAM(S): 10 INJECTION INTRAVENOUS at 05:11

## 2017-07-05 RX ADMIN — CYCLOBENZAPRINE HYDROCHLORIDE 5 MILLIGRAM(S): 10 TABLET, FILM COATED ORAL at 00:41

## 2017-07-05 RX ADMIN — TAMSULOSIN HYDROCHLORIDE 0.4 MILLIGRAM(S): 0.4 CAPSULE ORAL at 22:43

## 2017-07-05 RX ADMIN — Medication 650 MILLIGRAM(S): at 14:34

## 2017-07-05 RX ADMIN — Medication 1: at 22:43

## 2017-07-05 RX ADMIN — FAMOTIDINE 20 MILLIGRAM(S): 10 INJECTION INTRAVENOUS at 17:26

## 2017-07-05 RX ADMIN — Medication 650 MILLIGRAM(S): at 13:34

## 2017-07-05 RX ADMIN — Medication 650 MILLIGRAM(S): at 19:59

## 2017-07-05 NOTE — PROGRESS NOTE ADULT - PROBLEM SELECTOR PROBLEM 1
Fever postop
Acute left-sided low back pain with left-sided sciatica

## 2017-07-05 NOTE — PROGRESS NOTE ADULT - PROBLEM SELECTOR PLAN 3
off antihypertensive medication. The blood pressure is stable
all antihypertensive medication. The blood pressure decrease postoperatively you to volume loss but improved compared to yesterday
all antihypertensive medication. The blood pressure decrease postoperatively you to volume loss but improved compared to yesterday
off antihypertensive medication. The blood pressure is stable
all antihypertensive medication. The blood pressure decrease postoperatively you to volume loss

## 2017-07-05 NOTE — PROGRESS NOTE ADULT - NEUROLOGICAL SYMPTOMS
paresthesias

## 2017-07-05 NOTE — PROGRESS NOTE ADULT - LYMPH NODES
No lymphadedenopathy

## 2017-07-05 NOTE — PROGRESS NOTE ADULT - VASCULAR
Equal and normal pulses (carotid, femoral, dorsalis pedis)

## 2017-07-05 NOTE — PROGRESS NOTE ADULT - EXTREMITIES
No cyanosis, clubbing or edema

## 2017-07-05 NOTE — PROGRESS NOTE ADULT - CONSTITUTIONAL
Well-developed, well nourished

## 2017-07-05 NOTE — PROGRESS NOTE ADULT - PROBLEM SELECTOR PLAN 4
follow blood sugar continue insulin sliding scale.  her fingerstick is controlled
follow blood sugar continue insulin sliding scale
follow blood sugar continue insulin sliding scale
follow blood sugar continue insulin sliding scale.  her fingerstick is controlled
follow blood sugar continue insulin sliding scale

## 2017-07-05 NOTE — PROGRESS NOTE ADULT - PROBLEM SELECTOR PLAN 10
fluid restriction and follow on Na

## 2017-07-05 NOTE — PROGRESS NOTE ADULT - SUBJECTIVE AND OBJECTIVE BOX
ORTHO NOTE    [X ] Pt seen/examined.  [ ] Pt without any complaints/in NAD.    [X ] Pt complains of:  incisional pain controlled.  c/o LE edema, has been there this whole hospitalization, doppler was negative.  Walked in the halls w/PT      ROS: [ ] Fever  [ ] Chills  [ ] CP [ ] SOB [ ] Dysnea  [ ] Palpitations [ ] Cough [ ] N/V/C/D [ ] Paresthia [ ] Other     [X ] ROS  otherwise negative    .    PHYSICAL EXAM:    Vital Signs Last 24 Hrs  T(C): 36.3 (05 Jul 2017 09:20), Max: 36.7 (04 Jul 2017 14:01)  T(F): 97.3 (05 Jul 2017 09:20), Max: 98 (04 Jul 2017 14:01)  HR: 79 (05 Jul 2017 09:20) (68 - 85)  BP: 106/74 (05 Jul 2017 09:20) (106/74 - 129/84)  BP(mean): --  RR: 17 (05 Jul 2017 05:47) (16 - 17)  SpO2: 95% (05 Jul 2017 05:47) (95% - 100%)    I&O's Detail    04 Jul 2017 07:01  -  05 Jul 2017 07:00  --------------------------------------------------------  IN:    Oral Fluid: 480 mL  Total IN: 480 mL    OUT:    Indwelling Catheter - Urethral: 3550 mL  Total OUT: 3550 mL    Total NET: -3070 mL      05 Jul 2017 07:01  -  05 Jul 2017 11:16  --------------------------------------------------------  IN:    Oral Fluid: 240 mL  Total IN: 240 mL    OUT:    Indwelling Catheter - Urethral: 500 mL  Total OUT: 500 mL    Total NET: -260 mL           CAPILLARY BLOOD GLUCOSE  101 (05 Jul 2017 06:38)  158 (04 Jul 2017 21:08)  134 (04 Jul 2017 17:09)                      Neuro:  NAD A and O x 3    Lungs:    CV:    ABD: softly distended nt +BS    Ext:  LE strength/motor exam unchanged, b/l le edema unchanged    Back incision healing well, staples in place    LABS                        9.0    6.8   )-----------( 594      ( 05 Jul 2017 06:12 )             26.3                                07-05    132<L>  |  97  |  10  ----------------------------<  109<H>  3.9   |  24  |  0.70    Ca    8.2<L>      05 Jul 2017 06:12        [ ] Other Labs  [ ] None ordered            Please check or [place] when present:  •  Heart Failure:    [ ] Acute        [ ]  Acute on Chronic        [ ] Chronic         [ ] Diastolic     [ ]  Combined    •  ASIM:     [ ] ATN        [ ]  Renal medullary necrosis       [ ]  Renal cortical necrosis                  [ ] Other pathological Lesion:  •  CKD:  [ ] Stage I   [ ] Stage II  [ ] Stage III    [ ]Stage IV   [ ]  CKD V   [ ]  Other/Unspecified:    •  Abdominal Nutritional Status:   [ ] Malnutrition-See Nutrition note    [ ] Cachexia   [ ]  Other        [ ] Supplement ordered:            [ ] Morbid Obesity: BMI >=40         ASSESSMENT/PLAN:      STATUS POST: T10 to pelvis PSF 6/20     CONTINUE:          [ ] PT wbat    [ ] DVT PPX- scd    [ ] Pain Mgt service is following    [ ] Dispo plan- home    Plan to go w/arellano per urology as failed void trial multiple times, he will f/u with urology clinic at [facility].  Am waiting to hear from Fernando about d/c'ing staples.  IS use.  Bowel regimen.  Dr Zhang for med - he is cleared to be discharged from his standpoint.  Needs another PT session before cleared.

## 2017-07-05 NOTE — PROGRESS NOTE ADULT - PROBLEM SELECTOR PROBLEM 7
Hypotension

## 2017-07-05 NOTE — PROGRESS NOTE ADULT - NS ABD PE RECTAL EXAM
not examined

## 2017-07-05 NOTE — PROGRESS NOTE ADULT - PROBLEM SELECTOR PLAN 5
patient is clinically stable. There was increase in  the blood pressure due to blood loss.  SCD and pain meds  US negative for DVT
patient is clinically stable. There was increase in  the blood pressure due to blood loss.  SCD and pain meds
patient is clinically stable. There was increase in  the blood pressure due to blood loss.  SCD and pain meds  US negative for DVT
patient is clinically stable. There was increase in  the blood pressure due to blood loss.  SCD and pain meds

## 2017-07-05 NOTE — PROGRESS NOTE ADULT - PROBLEM SELECTOR PLAN 6
transfused two units all packed RBCs,  follow on repeat CBC.  there is increased and the ADÁN drainage.
transfused two units all packed RBCs, CBC stable
transfused two units all packed RBCs,  follow on repeat CBC.  there is increased and the ADÁN drainage.

## 2017-07-05 NOTE — PROGRESS NOTE ADULT - PROBLEM SELECTOR PROBLEM 4
Type 2 diabetes mellitus

## 2017-07-05 NOTE — PROGRESS NOTE ADULT - SUBJECTIVE AND OBJECTIVE BOX
Pain Management Progress Note - [facility] [phone]    HPI:  Pt seen and examined. Pt is doing well on current regimen. Surgical site pain is well managed, has mild diffuse musculskeletal pain per baseline.           Pertinent PMH: Pain at: __x_Back ___Neck___Knee ___Hip ___Shoulder ___ Opioid tolerance    Pain is _x__ sharp _x___dull ___burning ___achy ___ Intensity: ____ mild ____mod ____severe     Location __x___surgical site _____cervical _____lumbar ____abd _____upper ext____lower ext    Worse with _x___activity ____movement _____physical therapy___ Rest    Improved with _x___medication ____rest ____physical therapy    ketorolac   Injectable  diazepam  Injectable  oxyCODONE  5 mG/acetaminophen 325 mG  oxyCODONE  5 mG/acetaminophen 325 mG  lactated ringers.  acetaminophen   Tablet  acetaminophen   Tablet.  oxyCODONE IR  oxyCODONE IR  morphine  - Injectable  magnesium hydroxide Suspension  bisacodyl Suppository  docusate sodium  lactated ringers.  HYDROmorphone  Injectable  ceFAZolin   IVPB  aluminum hydroxide/magnesium hydroxide/simethicone Suspension  famotidine    Tablet  ondansetron Injectable  docusate sodium  senna  multivitamin  HYDROmorphone PCA (1 mG/mL)  HYDROmorphone PCA (1 mG/mL) Rescue Clinician Bolus  naloxone Injectable  acetaminophen  IVPB.  (ADM OVERRIDE)  lactated ringers Bolus  sodium chloride 0.9% Bolus  diazepam    Tablet  lidocaine   Patch  acetaminophen   Tablet  acetaminophen   Tablet.  lidocaine   Patch  oxyCODONE IR  oxyCODONE IR  HYDROmorphone  Injectable  trimethoprim  160 mG/sulfamethoxazole 800 mG  insulin lispro (HumaLOG) corrective regimen sliding scale  dextrose 5%.  dextrose Gel  dextrose 50% Injectable  dextrose 50% Injectable  dextrose 50% Injectable  glucagon  Injectable  piperacillin/tazobactam IVPB.  piperacillin/tazobactam IVPB.  vancomycin  IVPB  vancomycin  IVPB  zaleplon  vancomycin  IVPB  (ADM OVERRIDE)  vancomycin  IVPB  (ADM OVERRIDE)  sodium chloride 0.65% Nasal  (ADM OVERRIDE)  (ADM OVERRIDE)  acetaminophen 325 mG/butalbital 50 mG/caffeine 40 mG  (ADM OVERRIDE)  (ADM OVERRIDE)  metoclopramide  (ADM OVERRIDE)  simethicone  (ADM OVERRIDE)  metoclopramide Injectable  vancomycin  IVPB  (ADM OVERRIDE)  metoclopramide Injectable  magnesium hydroxide Suspension  metoclopramide Injectable  cyclobenzaprine  bisacodyl Suppository  tamsulosin  tamsulosin  (ADM OVERRIDE)  (ADM OVERRIDE)  (ADM OVERRIDE)  (ADM OVERRIDE)  vancomycin  IVPB  piperacillin/tazobactam IVPB.  HYDROmorphone  Injectable  (ADM OVERRIDE)  HYDROmorphone  Injectable  oxyCODONE IR  oxyCODONE IR  mineral oil enema  magnesium hydroxide Suspension  tamsulosin      ROS: Const:  _a__febrile   Eyes:___ENT:___CV: _-__chest pain  Resp: _-___sob  GI:_-__nausea ___vomiting _-___abd pain ___npo ___clears ___full diet __bm  :___ Musk: ___pain ___spasm  Skin:___ Neuro:  _-__sedation___confusion____ numbness ___weakness ___paresthesia  Psych:___anxiety  Endo:___ Heme:___Allergy:___      07-05 @ 06:62921 mL/min/1.73M2          Hemoglobin: 9.0 g/dL (07-05 @ 06:12)  Hemoglobin: 8.9 g/dL (07-04 @ 06:41)        T(C): 36.5 (07-05-17 @ 05:47), Max: 36.7 (07-04-17 @ 14:01)  HR: 68 (07-05-17 @ 05:47) (68 - 85)  BP: 107/72 (07-05-17 @ 05:47) (107/72 - 129/84)  RR: 17 (07-05-17 @ 05:47) (16 - 17)  SpO2: 95% (07-05-17 @ 05:47) (95% - 100%)  Wt(kg): --     PHYSICAL EXAM:  Gen Appearance: _x__no acute distress _x__appropriate         Neuro: _x__SILT feet_x___ EOM Intact Psych: AAOX_3_, __+_mood/affect appropriate        Eyes: _+__conjunctiva WNL  __+___ Pupils equal and round        ENT: __x_ears and nose atraumatic__x_ Hearing grossly intact        Neck: __x_trachea midline, no visible masses ___thyroid without palpable mass    Resp: _x__Nml WOB____No tactile fremitus ___clear to auscultation    Cardio: _x__extremities free from edema ____pedal pulses palpable    GI/Abdomen: _x__soft __x___ Nontender_x____Nondistended_____HSM    Lymphatic: ___no palpable nodes in neck  _x__no palpable nodes calves and feet    Skin/Wound: _x__Incision, ___Dressing c/d/i,   _x___surrounding tissues soft,  ___drain/chest tube present____    Muscular: EHL __5_/5  Gastrocnemius__5_/5    ___absent clubbing/cyanosis         ASSESSMENT:  This is a 62y old Male with a history of: multiple level spine surgery, doing well on current regimen  ACUTE LEFT-SIDED LOW BACK PAIN WITH LEFT-SIDED  No h/o HF  Family history of diabetes mellitus (Sibling)  Handoff  MEWS Score  Benign prostatic hypertrophy without lower urinary tract symptoms  Type 2 diabetes mellitus  Essential hypertension  Acute left-sided low back pain with left-sided sciatica  Hyponatremia  Urinary retention  Fever  Fever postop  Hypotension  Anemia due to blood loss  Preoperative clearance  Type 2 diabetes mellitus  Essential hypertension  Benign prostatic hypertrophy without lower urinary tract symptoms  Acute left-sided low back pain with left-sided sciatica  History of total knee replacement  BACK PAIN  90+        Recommended Treatment PLAN: Continue Oxydodone 5-10mg PO q4h prn Pain Management Progress Note - [facility] [phone]    HPI:  Pt seen and examined. Pt is doing well on current regimen. Surgical site pain is well managed, has mild diffuse musculskeletal pain per baseline.           Pertinent PMH: Pain at: __x_Back ___Neck___Knee ___Hip ___Shoulder ___ Opioid tolerance    Pain is _x__ sharp _x___dull ___burning ___achy ___ Intensity: ____ mild ____mod ____severe     Location __x___surgical site _____cervical _____lumbar ____abd _____upper ext____lower ext    Worse with _x___activity ____movement _____physical therapy___ Rest    Improved with _x___medication ____rest ____physical therapy    ketorolac   Injectable  diazepam  Injectable  oxyCODONE  5 mG/acetaminophen 325 mG  lactated ringers.  acetaminophen   Tablet.  oxyCODONE IR  morphine  - Injectable  magnesium hydroxide Suspension  bisacodyl Suppository  docusate sodium  lactated ringers.  HYDROmorphone  Injectable  ceFAZolin   IVPB  aluminum hydroxide/magnesium hydroxide/simethicone Suspension  famotidine    Tablet  ondansetron Injectable  docusate sodium  senna  multivitamin  HYDROmorphone PCA (1 mG/mL)  HYDROmorphone PCA (1 mG/mL) Rescue Clinician Bolus  naloxone Injectable  acetaminophen  IVPB.  (ADM OVERRIDE)  lactated ringers Bolus  sodium chloride 0.9% Bolus  diazepam    Tablet  lidocaine   Patch  acetaminophen   Tablet  acetaminophen   Tablet.  lidocaine   Patch  oxyCODONE IR  oxyCODONE IR  HYDROmorphone  Injectable  trimethoprim  160 mG/sulfamethoxazole 800 mG  insulin lispro (HumaLOG) corrective regimen sliding scale  dextrose 5%.  glucagon  Injectable  piperacillin/tazobactam IVPB.  piperacillin/tazobactam IVPB.  vancomycin  IVPB      ROS: Const:  _a__febrile   Eyes:___ENT:___CV: _-__chest pain  Resp: _-___sob  GI:_-__nausea ___vomiting _-___abd pain ___npo ___clears ___full diet __bm  :___ Musk: ___pain ___spasm  Skin:___ Neuro:  _-__sedation___confusion____ numbness ___weakness ___paresthesia  Psych:___anxiety  Endo:___ Heme:___Allergy:___      07-05 @ 06:94591 mL/min/1.73M2          Hemoglobin: 9.0 g/dL (07-05 @ 06:12)  Hemoglobin: 8.9 g/dL (07-04 @ 06:41)        T(C): 36.5 (07-05-17 @ 05:47), Max: 36.7 (07-04-17 @ 14:01)  HR: 68 (07-05-17 @ 05:47) (68 - 85)  BP: 107/72 (07-05-17 @ 05:47) (107/72 - 129/84)  RR: 17 (07-05-17 @ 05:47) (16 - 17)  SpO2: 95% (07-05-17 @ 05:47) (95% - 100%)  Wt(kg): --     PHYSICAL EXAM:  Gen Appearance: _x__no acute distress _x__appropriate         Neuro: _x__SILT feet_x___ EOM Intact Psych: AAOX_3_, __+_mood/affect appropriate        Eyes: _+__conjunctiva WNL  __+___ Pupils equal and round        ENT: __x_ears and nose atraumatic__x_ Hearing grossly intact        Neck: __x_trachea midline, no visible masses ___thyroid without palpable mass    Resp: _x__Nml WOB____No tactile fremitus ___clear to auscultation    Cardio: _x__extremities free from edema ____pedal pulses palpable    GI/Abdomen: _x__soft __x___ Nontender_x____Nondistended_____HSM    Lymphatic: ___no palpable nodes in neck  _x__no palpable nodes calves and feet    Skin/Wound: _x__Incision, ___Dressing c/d/i,   _x___surrounding tissues soft,  ___drain/chest tube present____    Muscular: EHL __5_/5  Gastrocnemius__5_/5    ___absent clubbing/cyanosis         ASSESSMENT:  This is a 62y old Male with a history of: multiple level spine surgery, doing well on current regimen  ACUTE LEFT-SIDED LOW BACK PAIN WITH LEFT-SIDED  No h/o HF  Family history of diabetes mellitus (Sibling)  Handoff  MEWS Score  Benign prostatic hypertrophy without lower urinary tract symptoms  Type 2 diabetes mellitus  Essential hypertension  Acute left-sided low back pain with left-sided sciatica  Hyponatremia  Urinary retention  Fever  Fever postop  Hypotension  Anemia due to blood loss  Preoperative clearance  Type 2 diabetes mellitus  Essential hypertension  Benign prostatic hypertrophy without lower urinary tract symptoms  Acute left-sided low back pain with left-sided sciatica  History of total knee replacement  BACK PAIN  90+        Recommended Treatment PLAN: Continue Oxydodone 5-10mg PO q4h prn

## 2017-07-05 NOTE — PROGRESS NOTE ADULT - EYES
EOMI; PERRL; no drainage or redness

## 2017-07-05 NOTE — PROGRESS NOTE ADULT - PROBLEM SELECTOR PLAN 7
is related to volume loss postoperatively. Patient Was transfused two units blood and was fluid resuscitated.   resolved
is related to volume loss postoperatively. Patient Was transfused two units blood and was fluid resuscitated.   no evidence of other type of shocks.
is related to volume loss postoperatively. Patient Was transfused two units blood and was fluid resuscitated.   no evidence of other type of shocks.
is related to volume loss postoperatively. Patient Was transfused two units blood and was fluid resuscitated.   resolved
is related to volume loss postoperatively. Patient Was transfused two units blood and was fluid resuscitated.  s no evidence of other type of shocks.

## 2017-07-05 NOTE — PROGRESS NOTE ADULT - PROBLEM SELECTOR PLAN 2
patient has a Ratliff postoperatively. Continue Flomax.  I discussed orthopedic. The patient has a dural tear and is on bed rest. Ratliff reinserted
patient has a Arellano postoperatively. Continue Flomax.  I discussed orthopedic  the only was reinserted back and to be discharged with arellano.  follow with urology
patient has a Ratliff postoperatively. Continue Flomax
patient has a Ratliff postoperatively. Continue Flomax.  I discussed orthopedic  the only was reinserted on Saturday and was taken off today observe
patient has a Ratliff postoperatively. Continue Flomax.  I discussed orthopedic. The patient has a dural tear and is on bed rest. Keep Ratliff in for now
patient has a Ratliff postoperatively. Continue Flomax.  I discussed orthopedic. The patient has a dural tear and is on bed rest. Ratliff discontinued
patient has a Ratliff postoperatively. Continue Flomax.  I discussed orthopedic. The patient has a dural tear and is on bed rest. Ratliff discontinued in am
patient has a Ratliff postoperatively. Continue Flomax.  I discussed orthopedic. The patient has a dural tear and is on bed rest. Ratliff reinserted.  voiding trial tomorrow
patient has a Ratliff postoperatively. Continue Flomax.  I discussed orthopedic. The patient has a dural tear and is on bed rest. Ratliff reinserted.  voiding trial tomorrow
patient has a Ratliff postoperatively. Continue Flomax

## 2017-07-05 NOTE — PROGRESS NOTE ADULT - PROBLEM SELECTOR PLAN 8
the patient was cultured. Cultures are negative to date. Patient was started on vancomycin and Zosyn. No clinical obvious source for infection.  Vanco level is stable.  DC antibiotics if cultures are negative and 5 days of treatment
older cultures are negative and please discontinue antibiotic therapy as a risk of C. difficile colitis.  Discontinue laxative.
older cultures are negative and please discontinue antibiotic therapy as a risk of C. difficile colitis.  Discontinue laxative.  Off antibiotics
the patient was cultured. Cultures are negative to date. Patient was started on vancomycin and Zosyn. No clinical obvious source for infection.  Vanco level is stable
the patient was cultured. Cultures are negative to date. Patient was started on vancomycin and Zosyn. No clinical obvious source for infection.

## 2017-07-05 NOTE — PROGRESS NOTE ADULT - PROBLEM SELECTOR PLAN 9
voiding trial in the morning.
the US revealed bladder retention nd arellano reinserrted   He started on proscar in addition and will observe

## 2017-07-05 NOTE — PROGRESS NOTE ADULT - PROBLEM SELECTOR PROBLEM 6
Anemia due to blood loss

## 2017-07-05 NOTE — PROGRESS NOTE ADULT - SUBJECTIVE AND OBJECTIVE BOX
Interval Events: reviewed  Patient seen and examined at bedside.    Patient is a 62y old  Male who presents with a chief complaint of Back pain, RLE spasms, LLE pain for 3 weeks, getting worse. (20 Jun 2017 13:05)    he is doing better. His leg is swollen. The swelling is down with leg elevation. He couldn't urinate and the catheter was put back  PAST MEDICAL & SURGICAL HISTORY:  Benign prostatic hypertrophy without lower urinary tract symptoms: BPH (benign prostatic hyperplasia)  Type 2 diabetes mellitus: Diabetes mellitus type 2 in obese  Essential hypertension: Hypertension  History of total knee replacement: Total knee replacement status, right      MEDICATIONS:  Pulmonary:    Antimicrobials:    Anticoagulants:    Cardiac:  tamsulosin 0.4 milliGRAM(s) Oral at bedtime      Allergies    No Known Allergies    Intolerances        Vital Signs Last 24 Hrs  T(C): 36.8 (05 Jul 2017 13:54), Max: 36.8 (05 Jul 2017 13:54)  T(F): 98.2 (05 Jul 2017 13:54), Max: 98.2 (05 Jul 2017 13:54)  HR: 86 (05 Jul 2017 13:54) (68 - 86)  BP: 136/87 (05 Jul 2017 13:54) (106/74 - 136/87)  BP(mean): --  RR: 17 (05 Jul 2017 13:54) (17 - 17)  SpO2: 99% (05 Jul 2017 13:54) (95% - 100%)    07-04 @ 07:01  -  07-05 @ 07:00  --------------------------------------------------------  IN: 480 mL / OUT: 3550 mL / NET: -3070 mL    07-05 @ 07:01 - 07-05 @ 15:36  --------------------------------------------------------  IN: 600 mL / OUT: 1100 mL / NET: -500 mL          LABS:      CBC Full  -  ( 05 Jul 2017 06:12 )  WBC Count : 6.8 K/uL  Hemoglobin : 9.0 g/dL  Hematocrit : 26.3 %  Platelet Count - Automated : 594 K/uL  Mean Cell Volume : 83.0 fL  Mean Cell Hemoglobin : 28.4 pg  Mean Cell Hemoglobin Concentration : 34.2 g/dL  Auto Neutrophil # : x  Auto Lymphocyte # : x  Auto Monocyte # : x  Auto Eosinophil # : x  Auto Basophil # : x  Auto Neutrophil % : x  Auto Lymphocyte % : x  Auto Monocyte % : x  Auto Eosinophil % : x  Auto Basophil % : x    07-05    132<L>  |  97  |  10  ----------------------------<  109<H>  3.9   |  24  |  0.70    Ca    8.2<L>      05 Jul 2017 06:12            Urinalysis Basic - ( 05 Jul 2017 08:52 )    Color: Yellow / Appearance: Clear / SG: <=1.005 / pH: x  Gluc: x / Ketone: NEGATIVE  / Bili: NEGATIVE / Urobili: 0.2 E.U./dL   Blood: x / Protein: NEGATIVE mg/dL / Nitrite: NEGATIVE   Leuk Esterase: NEGATIVE / RBC: < 5 /HPF / WBC < 5 /HPF   Sq Epi: x / Non Sq Epi: x / Bacteria: Present /HPF                  RADIOLOGY & ADDITIONAL STUDIES (The following images were personally reviewed):  Ratliff:                            Yes          Urine output:               Yes          DVT prophylaxis:         Yes          Flattus:                          Yes        Bowel movement:       Yes

## 2017-07-05 NOTE — PROGRESS NOTE ADULT - RESPIRATORY
Breath Sounds equal & clear to percussion & auscultation, no accessory muscle use

## 2017-07-05 NOTE — PROGRESS NOTE ADULT - SUBJECTIVE AND OBJECTIVE BOX
Patient seen and examined at bedside.     SUBJECTIVE: No acute events overnight.  Pain tolerable.    Denies Chest Pain/SOB.    Denies Headache.    Denies Nausea/vomiting.      Vital Signs Last 24 Hrs  T(C): 36.5 (05 Jul 2017 05:47), Max: 36.9 (04 Jul 2017 08:46)  T(F): 97.7 (05 Jul 2017 05:47), Max: 98.5 (04 Jul 2017 08:46)  HR: 68 (05 Jul 2017 05:47) (68 - 90)  BP: 107/72 (05 Jul 2017 05:47) (107/72 - 129/84)  BP(mean): --  RR: 17 (05 Jul 2017 05:47) (16 - 17)  SpO2: 95% (05 Jul 2017 05:47) (95% - 100%)      NAD, non labored respirations  Affected extremity:          Dressing: clean/dry/intact          Drains: none         Sensation: [x ] intact to light touch  [ ] decreased                              Vascular: [x ] warm well perfused; capillary refill <3 seconds          Motor exam: [x ]         [ ] Upper extremity                   Earnest (c5)   Bi(c5/6)   WE(c6)   EE(c7)    (c8)                                                R           5              5            5             5             5                                               L            5              5            5             5            5         [x ] Lower extremity                   IP(L2)     Q(L3)     TA(L4)     EHL(L5)     GS(s1)                                                 R          5           5          5            3              3                                               L           5           5         5             2              2         A/P :  62y Male s/p Rev T10-Pelvis PSF 6/20/17      -    Pain control + bowel regimen  -    DVT ppx: SCDs    -    Periop abx    -    ADAT  -    Check AM labs  -    Weight bearing status:   WBAT        -    Physical Therapy  -    Resume home meds  -    Dispo planning

## 2017-07-05 NOTE — PROGRESS NOTE ADULT - PROBLEM SELECTOR PROBLEM 2
Benign prostatic hypertrophy without lower urinary tract symptoms

## 2017-07-06 VITALS
RESPIRATION RATE: 16 BRPM | TEMPERATURE: 98 F | DIASTOLIC BLOOD PRESSURE: 77 MMHG | HEART RATE: 92 BPM | SYSTOLIC BLOOD PRESSURE: 116 MMHG | OXYGEN SATURATION: 95 %

## 2017-07-06 PROCEDURE — 72070 X-RAY EXAM THORAC SPINE 2VWS: CPT

## 2017-07-06 PROCEDURE — 87086 URINE CULTURE/COLONY COUNT: CPT

## 2017-07-06 PROCEDURE — 95940 IONM IN OPERATNG ROOM 15 MIN: CPT

## 2017-07-06 PROCEDURE — 93005 ELECTROCARDIOGRAM TRACING: CPT

## 2017-07-06 PROCEDURE — 85652 RBC SED RATE AUTOMATED: CPT

## 2017-07-06 PROCEDURE — 71045 X-RAY EXAM CHEST 1 VIEW: CPT

## 2017-07-06 PROCEDURE — 85025 COMPLETE CBC W/AUTO DIFF WBC: CPT

## 2017-07-06 PROCEDURE — 83036 HEMOGLOBIN GLYCOSYLATED A1C: CPT

## 2017-07-06 PROCEDURE — C1713: CPT

## 2017-07-06 PROCEDURE — 86140 C-REACTIVE PROTEIN: CPT

## 2017-07-06 PROCEDURE — 80202 ASSAY OF VANCOMYCIN: CPT

## 2017-07-06 PROCEDURE — 96372 THER/PROPH/DIAG INJ SC/IM: CPT

## 2017-07-06 PROCEDURE — 87641 MR-STAPH DNA AMP PROBE: CPT

## 2017-07-06 PROCEDURE — P9016: CPT

## 2017-07-06 PROCEDURE — 87040 BLOOD CULTURE FOR BACTERIA: CPT

## 2017-07-06 PROCEDURE — C1769: CPT

## 2017-07-06 PROCEDURE — 97116 GAIT TRAINING THERAPY: CPT

## 2017-07-06 PROCEDURE — 86850 RBC ANTIBODY SCREEN: CPT

## 2017-07-06 PROCEDURE — 72100 X-RAY EXAM L-S SPINE 2/3 VWS: CPT

## 2017-07-06 PROCEDURE — 88302 TISSUE EXAM BY PATHOLOGIST: CPT

## 2017-07-06 PROCEDURE — 85027 COMPLETE CBC AUTOMATED: CPT

## 2017-07-06 PROCEDURE — 99285 EMERGENCY DEPT VISIT HI MDM: CPT | Mod: 25

## 2017-07-06 PROCEDURE — 80048 BASIC METABOLIC PNL TOTAL CA: CPT

## 2017-07-06 PROCEDURE — C1889: CPT

## 2017-07-06 PROCEDURE — 85730 THROMBOPLASTIN TIME PARTIAL: CPT

## 2017-07-06 PROCEDURE — 81001 URINALYSIS AUTO W/SCOPE: CPT

## 2017-07-06 PROCEDURE — 80053 COMPREHEN METABOLIC PANEL: CPT

## 2017-07-06 PROCEDURE — 97162 PT EVAL MOD COMPLEX 30 MIN: CPT

## 2017-07-06 PROCEDURE — 86923 COMPATIBILITY TEST ELECTRIC: CPT

## 2017-07-06 PROCEDURE — 36430 TRANSFUSION BLD/BLD COMPNT: CPT

## 2017-07-06 PROCEDURE — 76000 FLUOROSCOPY <1 HR PHYS/QHP: CPT

## 2017-07-06 PROCEDURE — 97161 PT EVAL LOW COMPLEX 20 MIN: CPT

## 2017-07-06 PROCEDURE — 85610 PROTHROMBIN TIME: CPT

## 2017-07-06 PROCEDURE — 72131 CT LUMBAR SPINE W/O DYE: CPT

## 2017-07-06 PROCEDURE — 86901 BLOOD TYPING SEROLOGIC RH(D): CPT

## 2017-07-06 PROCEDURE — 86900 BLOOD TYPING SEROLOGIC ABO: CPT

## 2017-07-06 PROCEDURE — 72128 CT CHEST SPINE W/O DYE: CPT

## 2017-07-06 PROCEDURE — 36415 COLL VENOUS BLD VENIPUNCTURE: CPT

## 2017-07-06 PROCEDURE — 88311 DECALCIFY TISSUE: CPT

## 2017-07-06 PROCEDURE — 93970 EXTREMITY STUDY: CPT

## 2017-07-06 RX ORDER — TAMSULOSIN HYDROCHLORIDE 0.4 MG/1
1 CAPSULE ORAL
Qty: 30 | Refills: 0 | OUTPATIENT
Start: 2017-07-06 | End: 2017-08-05

## 2017-07-06 RX ORDER — DOCUSATE SODIUM 100 MG
1 CAPSULE ORAL
Qty: 0 | Refills: 0 | COMMUNITY
Start: 2017-07-06

## 2017-07-06 RX ADMIN — OXYCODONE HYDROCHLORIDE 10 MILLIGRAM(S): 5 TABLET ORAL at 03:38

## 2017-07-06 RX ADMIN — Medication 1 TABLET(S): at 11:51

## 2017-07-06 RX ADMIN — SIMETHICONE 80 MILLIGRAM(S): 80 TABLET, CHEWABLE ORAL at 03:41

## 2017-07-06 RX ADMIN — OXYCODONE HYDROCHLORIDE 10 MILLIGRAM(S): 5 TABLET ORAL at 16:32

## 2017-07-06 RX ADMIN — OXYCODONE HYDROCHLORIDE 10 MILLIGRAM(S): 5 TABLET ORAL at 17:32

## 2017-07-06 RX ADMIN — FAMOTIDINE 20 MILLIGRAM(S): 10 INJECTION INTRAVENOUS at 07:16

## 2017-07-06 RX ADMIN — OXYCODONE HYDROCHLORIDE 10 MILLIGRAM(S): 5 TABLET ORAL at 04:10

## 2017-07-06 RX ADMIN — Medication: at 12:09

## 2017-07-06 NOTE — PROCEDURE NOTE - NSPOSTCAREGUIDE_GEN_A_CORE
Keep the cast/splint/dressing clean and dry/Instructed patient/caregiver regarding signs and symptoms of infection/Verbal/written post procedure instructions were given to patient/caregiver

## 2017-07-07 PROBLEM — Z00.00 ENCOUNTER FOR PREVENTIVE HEALTH EXAMINATION: Status: ACTIVE | Noted: 2017-07-07

## 2017-07-10 DIAGNOSIS — D62 ACUTE POSTHEMORRHAGIC ANEMIA: ICD-10-CM

## 2017-07-10 DIAGNOSIS — M54.42 LUMBAGO WITH SCIATICA, LEFT SIDE: ICD-10-CM

## 2017-07-10 DIAGNOSIS — I10 ESSENTIAL (PRIMARY) HYPERTENSION: ICD-10-CM

## 2017-07-10 DIAGNOSIS — J69.0 PNEUMONITIS DUE TO INHALATION OF FOOD AND VOMIT: ICD-10-CM

## 2017-07-10 DIAGNOSIS — T84.216A BREAKDOWN (MECHANICAL) OF INTERNAL FIXATION DEVICE OF VERTEBRAE, INITIAL ENCOUNTER: ICD-10-CM
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
## 2017-07-10 DIAGNOSIS — Z98.1 ARTHRODESIS STATUS: ICD-10-CM

## 2017-07-10 DIAGNOSIS — E11.9 TYPE 2 DIABETES MELLITUS WITHOUT COMPLICATIONS: ICD-10-CM

## 2017-07-10 DIAGNOSIS — N40.0 BENIGN PROSTATIC HYPERPLASIA WITHOUT LOWER URINARY TRACT SYMPTOMS: ICD-10-CM

## 2017-07-10 DIAGNOSIS — G97.41 ACCIDENTAL PUNCTURE OR LACERATION OF DURA DURING A PROCEDURE: ICD-10-CM

## 2017-07-10 DIAGNOSIS — M96.0 PSEUDARTHROSIS AFTER FUSION OR ARTHRODESIS: ICD-10-CM

## 2017-07-10 DIAGNOSIS — Z96.651 PRESENCE OF RIGHT ARTIFICIAL KNEE JOINT: ICD-10-CM

## 2017-07-10 DIAGNOSIS — E87.1 HYPO-OSMOLALITY AND HYPONATREMIA: ICD-10-CM

## 2017-07-10 DIAGNOSIS — R33.9 RETENTION OF URINE, UNSPECIFIED: ICD-10-CM

## 2017-07-10 DIAGNOSIS — M54.9 DORSALGIA, UNSPECIFIED: ICD-10-CM

## 2017-07-10 DIAGNOSIS — E66.9 OBESITY, UNSPECIFIED: ICD-10-CM

## 2017-07-10 DIAGNOSIS — I95.81 POSTPROCEDURAL HYPOTENSION: ICD-10-CM

## 2017-07-12 ENCOUNTER — APPOINTMENT (OUTPATIENT)
Dept: UROLOGY | Facility: CLINIC | Age: 62
End: 2017-07-12

## 2018-01-01 NOTE — PROGRESS NOTE ADULT - PROVIDER SPECIALTY LIST ADULT
Infectious Disease
Internal Medicine
Orthopedics
Pain Medicine
[id number]
Internal Medicine

## 2019-11-26 NOTE — PROGRESS NOTE ADULT - SUBJECTIVE AND OBJECTIVE BOX
Detail Level: Detailed Add [id number] Cpt? (Important Note: In 2017 The Use Of 60080 Is Being Tracked By Cms To Determine Future Global Period Reimbursement For Global Periods): no Patient seen and examined at bedside. Stood to head towards bathroom.     SUBJECTIVE: No acute events overnight.  Pain tolerable.    Denies Chest Pain/SOB.    Denies Headache.    Denies Nausea/vomiting.    Vital Signs Last 24 Hrs  T(C): 36 (06 Jul 2017 05:32), Max: 36.8 (05 Jul 2017 13:54)  T(F): 96.8 (06 Jul 2017 05:32), Max: 98.2 (05 Jul 2017 13:54)  HR: 94 (06 Jul 2017 05:32) (79 - 94)  BP: 109/74 (06 Jul 2017 05:32) (106/74 - 136/87)  BP(mean): --  RR: 17 (06 Jul 2017 05:32) (16 - 17)  SpO2: 97% (06 Jul 2017 05:32) (97% - 99%)      NAD, non labored respirations  Affected extremity:          Dressing: clean/dry/intact          Drains: none         Sensation: [x ] intact to light touch  [ ] decreased                              Vascular: [x ] warm well perfused; capillary refill <3 seconds          Motor exam: [x ]         [ ] Upper extremity                   Earnest (c5)   Bi(c5/6)   WE(c6)   EE(c7)    (c8)                                                R           5              5            5             5             5                                               L            5              5            5             5            5         [x ] Lower extremity                   IP(L2)     Q(L3)     TA(L4)     EHL(L5)     GS(s1)                                                 R          5           5          5            3              3                                               L           5           5         5             2              2         A/P :  62y Male s/p Rev T10-Pelvis PSF 6/20/17      -    Pain control + bowel regimen  -    DVT ppx: SCDs    -    Periop abx    -    ADAT  -    Check AM labs  -    Weight bearing status:   WBAT        -    Physical Therapy  -    Resume home meds  -    Dispo planning

## 2020-01-08 NOTE — PHYSICAL THERAPY INITIAL EVALUATION ADULT - ANKLE DORSIFLEXION MMT, REHAB EVAL
4+ = good plus/4+ = good plus Regular rate and rhythm, Heart sounds S1 S2 present, no murmurs, rubs or gallops

## 2022-09-09 NOTE — OCCUPATIONAL THERAPY INITIAL EVALUATION ADULT - PRECAUTIONS/LIMITATIONS, REHAB EVAL
Returned call. Spoke with mom. Patient was seen in the office on 8/23/2022. Started on Adderall XR 10mg. Mom said that medication is not helping at all. Mom asked if medication can be increased. Please advise   spinal precautions spinal precautions/fall precautions

## 2023-10-23 NOTE — PROGRESS NOTE ADULT - CARDIOVASCULAR
Pt informed and faxed negative Regular rate & rhythm, normal S1, S2; no murmurs, gallops or rubs; no S3, S4